# Patient Record
Sex: FEMALE | Race: WHITE | NOT HISPANIC OR LATINO | Employment: UNEMPLOYED | URBAN - METROPOLITAN AREA
[De-identification: names, ages, dates, MRNs, and addresses within clinical notes are randomized per-mention and may not be internally consistent; named-entity substitution may affect disease eponyms.]

---

## 2019-08-01 ENCOUNTER — HOSPITAL ENCOUNTER (EMERGENCY)
Facility: HOSPITAL | Age: 39
Discharge: HOME | End: 2019-08-01
Attending: FAMILY MEDICINE
Payer: COMMERCIAL

## 2019-08-01 VITALS
DIASTOLIC BLOOD PRESSURE: 7 MMHG | HEIGHT: 71 IN | TEMPERATURE: 99 F | HEART RATE: 94 BPM | RESPIRATION RATE: 20 BRPM | WEIGHT: 180 LBS | BODY MASS INDEX: 25.2 KG/M2 | OXYGEN SATURATION: 98 % | SYSTOLIC BLOOD PRESSURE: 124 MMHG
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
## 2019-08-01 DIAGNOSIS — S40.811A ABRASION OF RIGHT UPPER ARM, INITIAL ENCOUNTER: ICD-10-CM
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
## 2019-08-01 DIAGNOSIS — S40.021A TRAUMATIC HEMATOMA OF RIGHT UPPER ARM, INITIAL ENCOUNTER: Primary | ICD-10-CM

## 2019-08-01 DIAGNOSIS — Z34.91 FIRST TRIMESTER PREGNANCY: ICD-10-CM

## 2019-08-01 PROCEDURE — 99282 EMERGENCY DEPT VISIT SF MDM: CPT
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
## 2019-08-01 NOTE — DISCHARGE INSTRUCTIONS
Tylenol as directed for pain. Ice to right arm for 20 minute intervals. You will likely be more sore over next few days. Return to ER for any abdominal pain or vaginal bleeding.
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
## 2019-09-17 ENCOUNTER — TELEPHONE (OUTPATIENT)
Dept: MATERNAL FETAL MEDICINE | Facility: CLINIC | Age: 39
End: 2019-09-17

## 2019-09-17 DIAGNOSIS — O09.522 AMA (ADVANCED MATERNAL AGE) MULTIGRAVIDA 35+, SECOND TRIMESTER: ICD-10-CM

## 2019-09-17 DIAGNOSIS — Z36.89 ENCOUNTER FOR FETAL ANATOMIC SURVEY: ICD-10-CM

## 2019-09-17 DIAGNOSIS — Z36.9 ANTENATAL SCREENING ENCOUNTER: Primary | ICD-10-CM

## 2019-09-17 NOTE — TELEPHONE ENCOUNTER
Left voicemail message for patient to please call [facility] at [phone] to help book her  consult and targeted anatomy ultrasound due to AMA per Dr. YVONNE Roberts referral.

## 2019-09-23 ENCOUNTER — TELEPHONE (OUTPATIENT)
Dept: MATERNAL FETAL MEDICINE | Facility: CLINIC | Age: 39
End: 2019-09-23

## 2019-09-23 NOTE — TELEPHONE ENCOUNTER
Called Evelyn at the office to let her know that I have no good working numbers to contact patient and schedule her.  Evelyn will try to get patient and have her call us.

## 2019-10-04 ENCOUNTER — TELEPHONE (OUTPATIENT)
Dept: MATERNAL FETAL MEDICINE | Facility: CLINIC | Age: 39
End: 2019-10-04

## 2019-10-08 ENCOUNTER — TELEPHONE (OUTPATIENT)
Dept: MATERNAL FETAL MEDICINE | Facility: CLINIC | Age: 39
End: 2019-10-08

## 2019-10-08 NOTE — TELEPHONE ENCOUNTER
Left voicemail for patient on [phone]- calling to schedule ultrasound/consultation ordered by Dr. Roberts. Return phone number given.

## 2019-10-10 ENCOUNTER — TELEPHONE (OUTPATIENT)
Dept: MATERNAL FETAL MEDICINE | Facility: CLINIC | Age: 39
End: 2019-10-10

## 2019-10-10 NOTE — TELEPHONE ENCOUNTER
Patient returning calls to schedule her anatomy US/consult. Scheduled her for next available appt in [place] (11/13/19). Offered patient earlier appt in [place], but patient declines stating she has transportation issues. Verbalizes understanding of appt time, date and location and appt slip mailed.

## 2019-11-12 ENCOUNTER — TELEPHONE (OUTPATIENT)
Dept: MATERNAL FETAL MEDICINE | Facility: CLINIC | Age: 39
End: 2019-11-12

## 2019-11-12 NOTE — TELEPHONE ENCOUNTER
Reminder call made to patient. No answer at this time. LM for patient to arrive a745. Instructed her to call [phone] for any questions.

## 2019-11-13 ENCOUNTER — OFFICE VISIT (OUTPATIENT)
Dept: MATERNAL FETAL MEDICINE | Facility: CLINIC | Age: 39
End: 2019-11-13
Payer: MEDICARE

## 2019-11-13 VITALS
BODY MASS INDEX: 25.76 KG/M2 | HEIGHT: 71 IN | SYSTOLIC BLOOD PRESSURE: 111 MMHG | DIASTOLIC BLOOD PRESSURE: 67 MMHG | WEIGHT: 184 LBS

## 2019-11-13 DIAGNOSIS — O09.522 MULTIGRAVIDA OF ADVANCED MATERNAL AGE IN SECOND TRIMESTER: ICD-10-CM

## 2019-11-13 DIAGNOSIS — O09.522 AMA (ADVANCED MATERNAL AGE) MULTIGRAVIDA 35+, SECOND TRIMESTER: ICD-10-CM

## 2019-11-13 DIAGNOSIS — Z36.89 ENCOUNTER FOR ULTRASOUND TO ASSESS FETAL GROWTH: Primary | ICD-10-CM

## 2019-11-13 DIAGNOSIS — E03.9 HYPOTHYROID IN PREGNANCY, ANTEPARTUM: ICD-10-CM

## 2019-11-13 DIAGNOSIS — O99.280 HYPOTHYROID IN PREGNANCY, ANTEPARTUM: ICD-10-CM

## 2019-11-13 DIAGNOSIS — Z36.89 ENCOUNTER FOR FETAL ANATOMIC SURVEY: ICD-10-CM

## 2019-11-13 DIAGNOSIS — Z36.9 ANTENATAL SCREENING ENCOUNTER: ICD-10-CM

## 2019-11-13 PROCEDURE — 99202 PR OFFICE/OUTPT VISIT, NEW, LEVL II, 15-29 MIN: ICD-10-PCS | Mod: 25,,, | Performed by: OBSTETRICS & GYNECOLOGY

## 2019-11-13 PROCEDURE — 76811 OB US DETAILED SNGL FETUS: CPT | Mod: ,,, | Performed by: OBSTETRICS & GYNECOLOGY

## 2019-11-13 PROCEDURE — 99202 OFFICE O/P NEW SF 15 MIN: CPT | Mod: 25,,, | Performed by: OBSTETRICS & GYNECOLOGY

## 2019-11-13 PROCEDURE — 76811 PR US, OB FETAL EVAL & EXAM, TRANSABDOM,FIRST GESTATION: ICD-10-PCS | Mod: ,,, | Performed by: OBSTETRICS & GYNECOLOGY

## 2019-11-13 RX ORDER — BUPROPION HYDROCHLORIDE 150 MG/1
150 TABLET ORAL DAILY
COMMUNITY
End: 2020-05-15

## 2019-11-13 RX ORDER — LEVOTHYROXINE SODIUM 150 UG/1
TABLET ORAL
Refills: 3 | COMMUNITY
Start: 2019-10-11 | End: 2020-03-25

## 2019-11-13 RX ORDER — ALBUTEROL SULFATE 0.83 MG/ML
2.5 SOLUTION RESPIRATORY (INHALATION) EVERY 6 HOURS PRN
COMMUNITY

## 2019-11-13 NOTE — PROGRESS NOTES
Chief complaint: AMA, Hypothyroid pregnancy    Provider requesting consultation: Dr. Roberts    39 y.o. [id number] 24w5d EGA    PMH:  Past Medical History:   Diagnosis Date    Abnormal Pap smear of cervix     AMA (advanced maternal age) multigravida 35+, second trimester     Asthma     Depression affecting pregnancy in second trimester, antepartum     Hypothyroidism        PObHx:  OB History    Para Term  AB Living   3 2 2     2   SAB TAB Ectopic Multiple Live Births           2      # Outcome Date GA Lbr Darshan/2nd Weight Sex Delivery Anes PTL Lv   3 Current            2 Term 10/20/15 39w0d  4.167 kg (9 lb 3 oz) M Vag-Spont   THERESA      Complications: Hx of preeclampsia, prior pregnancy, currently pregnant, second trimester   1 Term 10/30/06 40w0d  3.26 kg (7 lb 3 oz) M Vag-Spont   THERESA       PSH:  Past Surgical History:   Procedure Laterality Date    TYMPANOSTOMY TUBE PLACEMENT         Family history:family history is not on file.    Social history: reports that she has never smoked. She has never used smokeless tobacco. She reports that she does not drink alcohol or use drugs.    A detailed fetal anatomical ultrasound was completed today.  See details in imaging section of [facility].    Age based risk for Down syndrome at this gestational age is approximately 1 in 79  Aneuploidy screening this pregnancy estimates the risk of Down syndrome to be 1 in 10,000        Today the patient was counseled on the relationship between maternal age and genetic aneuploidy.  The patient was counseled on the risks and benefits of screening tests versus definitive genetic testing (amniocentesis). Patient was counseled about her specific age related risk of Down Syndrome. We discussed the limitations of ultrasound in the definitive diagnosis of Down Syndrome and we discussed amniocentesis as providing definitive diagnosis.  I quoted the patient a 1 in 1000 procedure related risk of fetal loss with genetic amniocentesis.  After today's consultation she  did not want to pursue genetic amniocentesis.    We also discussed non-invasive prenatal diagnosis(NIPD) for trisomy 21, 18 and 13 through free fetal DNA in maternal serum. Non-invasive prenatal diagnosis poses no fetal risks and is done through an in office blood draw. The majority of circulating cell-free fetal DNA in maternal serum comes from placental cells, therefore a small risk, similar to that seen for CVS results, exists for obtaining a result that may represent confined placental mosaicism.  Non-invasive prenatal diagnosis is available from 10 weeks gestation throughout the remainder of the pregnancy. Typically results are available within 8-10 days and are 99% sensitive and specific for trisomy 21 and trisomy 18. Results are 91 % sensitive and >99% specific for trisomy 13. There is no detailed information about the structure of chromosomes 21, 18 or 13, only the relative copy number. Follow-up diagnostic testing through CVS or amniocentesis is recommended for any abnormal result.      -We also reviewed that AMA is associated with an increased risk of adverse pregnancy outcomes such as miscarriage, ectopic pregnancy, diabetes, hypertension, and other adverse outcomes. There is also an increased risk of stillibirth, particularly in women age 40 and above.    -[facility] at [facility] recommends the following for women 35 and older at their J[facility]:   -Detailed fetal anatomic survey at 19 to 20 weeks gestation   -Ultrasound for fetal growth at 32 to 34 weeks gestation   -In women 40 years and older at JAK[facility], recommend weekly MVP and twice weekly NST beginning at 32 weeks gestation.   -In women 40 years and older at JAK[facility], recommend delivery between 39 and 40 weeks gestation.    Hypothyroidism in pregnancy counseling and recommendations:    In patients with underlying hypothyoidism, ACOG currently recommends following patients with TSH levels every four weeks until replacement therapy corrects  the TSH into a normal range.  Because of the unclear association of maternal subclinical hypothyroidism with decreased intelligence testing in their offspring, some experts recommend keeping the TSH level in the lower third of normal.  Once TSH level is in an adequate range, rechecking the TSH level every trimester is indicated.  Reconsult MFM as clinically indicated.    The patient was given an opportunity to ask questions about management and the diease process.  She expressed an understanding of and agreement to the above impression and plan. All questions were answered to her satisfaction.  She was given contact information to the MFM clinic to address further concerns.      The approximate physician face-to-face time was 15 minutes. The majority of the time (>50%) was spent on counseling of the patient or coordination of care.

## 2020-02-23 ENCOUNTER — HOSPITAL ENCOUNTER (INPATIENT)
Facility: HOSPITAL | Age: 40
LOS: 2 days | Discharge: HOME | End: 2020-02-25
Attending: OBSTETRICS & GYNECOLOGY | Admitting: OBSTETRICS & GYNECOLOGY
Payer: MEDICARE

## 2020-02-23 DIAGNOSIS — Z30.2 REQUEST FOR STERILIZATION: Primary | ICD-10-CM

## 2020-02-23 DIAGNOSIS — Z34.90 TERM PREGNANCY: ICD-10-CM

## 2020-02-23 DIAGNOSIS — Z3A.39 39 WEEKS GESTATION OF PREGNANCY: ICD-10-CM

## 2020-02-23 PROBLEM — J45.20 MILD INTERMITTENT ASTHMA WITHOUT COMPLICATION: Status: ACTIVE | Noted: 2020-02-23

## 2020-02-23 LAB
ABO + RH BLD: NORMAL
AMORPH CRY URNS QL MICRO: NORMAL
AMPHET+METHAMPHET UR QL: NEGATIVE
BACTERIA #/AREA URNS HPF: NORMAL /HPF
BARBITURATES UR QL SCN>200 NG/ML: NEGATIVE
BASOPHILS # BLD AUTO: 0.02 K/UL (ref 0–0.2)
BASOPHILS NFR BLD: 0.2 % (ref 0–1.9)
BENZODIAZ UR QL SCN>200 NG/ML: NEGATIVE
BILIRUB UR QL STRIP: NEGATIVE
BLD GP AB SCN CELLS X3 SERPL QL: NORMAL
BZE UR QL SCN: NEGATIVE
CANNABINOIDS UR QL SCN: NEGATIVE
CLARITY UR: CLEAR
COLOR UR: YELLOW
CREAT UR-MCNC: 37.2 MG/DL (ref 15–325)
DIFFERENTIAL METHOD: ABNORMAL
EOSINOPHIL # BLD AUTO: 0 K/UL (ref 0–0.5)
EOSINOPHIL NFR BLD: 0.4 % (ref 0–8)
ERYTHROCYTE [DISTWIDTH] IN BLOOD BY AUTOMATED COUNT: 13.1 % (ref 11.5–14.5)
GLUCOSE UR QL STRIP: NEGATIVE
HCT VFR BLD AUTO: 34 % (ref 37–48.5)
HGB BLD-MCNC: 11.1 G/DL (ref 12–16)
HGB UR QL STRIP: ABNORMAL
IMM GRANULOCYTES # BLD AUTO: 0.03 K/UL (ref 0–0.04)
IMM GRANULOCYTES NFR BLD AUTO: 0.4 % (ref 0–0.5)
KETONES UR QL STRIP: NEGATIVE
LEUKOCYTE ESTERASE UR QL STRIP: ABNORMAL
LYMPHOCYTES # BLD AUTO: 1.9 K/UL (ref 1–4.8)
LYMPHOCYTES NFR BLD: 22.9 % (ref 18–48)
MCH RBC QN AUTO: 28.5 PG (ref 27–31)
MCHC RBC AUTO-ENTMCNC: 32.6 G/DL (ref 32–36)
MCV RBC AUTO: 87 FL (ref 82–98)
MICROSCOPIC COMMENT: NORMAL
MONOCYTES # BLD AUTO: 0.7 K/UL (ref 0.3–1)
MONOCYTES NFR BLD: 8.6 % (ref 4–15)
NEUTROPHILS # BLD AUTO: 5.7 K/UL (ref 1.8–7.7)
NEUTROPHILS NFR BLD: 67.5 % (ref 38–73)
NITRITE UR QL STRIP: NEGATIVE
NRBC BLD-RTO: 0 /100 WBC
OPIATES UR QL SCN: NEGATIVE
PCP UR QL SCN>25 NG/ML: NEGATIVE
PH UR STRIP: 8 [PH] (ref 5–8)
PLATELET # BLD AUTO: 158 K/UL (ref 150–350)
PMV BLD AUTO: 12 FL (ref 9.2–12.9)
PROT UR QL STRIP: NEGATIVE
RBC # BLD AUTO: 3.9 M/UL (ref 4–5.4)
RBC #/AREA URNS HPF: 2 /HPF (ref 0–4)
SP GR UR STRIP: 1.02 (ref 1–1.03)
TOXICOLOGY INFORMATION: NORMAL
URN SPEC COLLECT METH UR: ABNORMAL
UROBILINOGEN UR STRIP-ACNC: NEGATIVE EU/DL
WBC # BLD AUTO: 8.41 K/UL (ref 3.9–12.7)
WBC #/AREA URNS HPF: 2 /HPF (ref 0–5)

## 2020-02-23 PROCEDURE — 80307 DRUG TEST PRSMV CHEM ANLYZR: CPT

## 2020-02-23 PROCEDURE — 25000003 PHARM REV CODE 250: Performed by: OBSTETRICS & GYNECOLOGY

## 2020-02-23 PROCEDURE — 85025 COMPLETE CBC W/AUTO DIFF WBC: CPT

## 2020-02-23 PROCEDURE — 36415 COLL VENOUS BLD VENIPUNCTURE: CPT

## 2020-02-23 PROCEDURE — 63600175 PHARM REV CODE 636 W HCPCS: Performed by: OBSTETRICS & GYNECOLOGY

## 2020-02-23 PROCEDURE — 86850 RBC ANTIBODY SCREEN: CPT

## 2020-02-23 PROCEDURE — 59025 FETAL NON-STRESS TEST: CPT

## 2020-02-23 PROCEDURE — 72200004 HC VAGINAL DELIVERY LEVEL I

## 2020-02-23 PROCEDURE — 87491 CHLMYD TRACH DNA AMP PROBE: CPT

## 2020-02-23 PROCEDURE — 81000 URINALYSIS NONAUTO W/SCOPE: CPT

## 2020-02-23 PROCEDURE — 11000001 HC ACUTE MED/SURG PRIVATE ROOM

## 2020-02-23 PROCEDURE — 63600175 PHARM REV CODE 636 W HCPCS

## 2020-02-23 PROCEDURE — 25000003 PHARM REV CODE 250

## 2020-02-23 PROCEDURE — 72100002 HC LABOR CARE, 1ST 8 HOURS

## 2020-02-23 RX ORDER — MISOPROSTOL 100 UG/1
800 TABLET ORAL
Status: DISCONTINUED | OUTPATIENT
Start: 2020-02-23 | End: 2020-02-25 | Stop reason: HOSPADM

## 2020-02-23 RX ORDER — HYDROCODONE BITARTRATE AND ACETAMINOPHEN 10; 325 MG/1; MG/1
1 TABLET ORAL EVERY 4 HOURS PRN
Status: DISCONTINUED | OUTPATIENT
Start: 2020-02-23 | End: 2020-02-25 | Stop reason: HOSPADM

## 2020-02-23 RX ORDER — SODIUM CHLORIDE, SODIUM LACTATE, POTASSIUM CHLORIDE, CALCIUM CHLORIDE 600; 310; 30; 20 MG/100ML; MG/100ML; MG/100ML; MG/100ML
INJECTION, SOLUTION INTRAVENOUS CONTINUOUS
Status: DISCONTINUED | OUTPATIENT
Start: 2020-02-23 | End: 2020-02-23

## 2020-02-23 RX ORDER — OXYTOCIN/RINGER'S LACTATE 30/500 ML
333 PLASTIC BAG, INJECTION (ML) INTRAVENOUS CONTINUOUS
Status: DISCONTINUED | OUTPATIENT
Start: 2020-02-23 | End: 2020-02-23

## 2020-02-23 RX ORDER — ONDANSETRON 4 MG/1
8 TABLET, ORALLY DISINTEGRATING ORAL EVERY 8 HOURS PRN
Status: DISCONTINUED | OUTPATIENT
Start: 2020-02-23 | End: 2020-02-23

## 2020-02-23 RX ORDER — ONDANSETRON 4 MG/1
8 TABLET, ORALLY DISINTEGRATING ORAL EVERY 8 HOURS PRN
Status: DISCONTINUED | OUTPATIENT
Start: 2020-02-23 | End: 2020-02-25 | Stop reason: HOSPADM

## 2020-02-23 RX ORDER — NAPROXEN 250 MG/1
500 TABLET ORAL EVERY 8 HOURS
Status: DISCONTINUED | OUTPATIENT
Start: 2020-02-24 | End: 2020-02-23

## 2020-02-23 RX ORDER — SODIUM CHLORIDE 9 MG/ML
INJECTION, SOLUTION INTRAVENOUS
Status: DISCONTINUED | OUTPATIENT
Start: 2020-02-23 | End: 2020-02-23

## 2020-02-23 RX ORDER — ACETAMINOPHEN 325 MG/1
650 TABLET ORAL EVERY 6 HOURS PRN
Status: DISCONTINUED | OUTPATIENT
Start: 2020-02-23 | End: 2020-02-25 | Stop reason: HOSPADM

## 2020-02-23 RX ORDER — MISOPROSTOL 100 UG/1
400 TABLET ORAL ONCE
Status: COMPLETED | OUTPATIENT
Start: 2020-02-23 | End: 2020-02-23

## 2020-02-23 RX ORDER — DOCUSATE SODIUM 100 MG/1
200 CAPSULE, LIQUID FILLED ORAL 2 TIMES DAILY PRN
Status: DISCONTINUED | OUTPATIENT
Start: 2020-02-23 | End: 2020-02-25 | Stop reason: HOSPADM

## 2020-02-23 RX ORDER — MISOPROSTOL 100 UG/1
TABLET ORAL
Status: COMPLETED
Start: 2020-02-23 | End: 2020-02-23

## 2020-02-23 RX ORDER — HYDROCODONE BITARTRATE AND ACETAMINOPHEN 7.5; 325 MG/1; MG/1
1 TABLET ORAL EVERY 4 HOURS PRN
Status: DISCONTINUED | OUTPATIENT
Start: 2020-02-23 | End: 2020-02-25 | Stop reason: HOSPADM

## 2020-02-23 RX ORDER — BUTORPHANOL TARTRATE 2 MG/ML
1 INJECTION INTRAMUSCULAR; INTRAVENOUS
Status: DISCONTINUED | OUTPATIENT
Start: 2020-02-23 | End: 2020-02-23

## 2020-02-23 RX ORDER — OXYTOCIN 10 [USP'U]/ML
20 INJECTION, SOLUTION INTRAMUSCULAR; INTRAVENOUS ONCE
Status: COMPLETED | OUTPATIENT
Start: 2020-02-23 | End: 2020-02-23

## 2020-02-23 RX ORDER — OXYTOCIN/RINGER'S LACTATE 30/500 ML
41.65 PLASTIC BAG, INJECTION (ML) INTRAVENOUS CONTINUOUS
Status: DISCONTINUED | OUTPATIENT
Start: 2020-02-23 | End: 2020-02-23

## 2020-02-23 RX ORDER — OXYTOCIN 10 [USP'U]/ML
INJECTION, SOLUTION INTRAMUSCULAR; INTRAVENOUS
Status: COMPLETED
Start: 2020-02-23 | End: 2020-02-23

## 2020-02-23 RX ORDER — NALOXONE HCL 0.4 MG/ML
VIAL (ML) INJECTION
Status: DISCONTINUED
Start: 2020-02-23 | End: 2020-02-23 | Stop reason: WASHOUT

## 2020-02-23 RX ORDER — KETOROLAC TROMETHAMINE 30 MG/ML
30 INJECTION, SOLUTION INTRAMUSCULAR; INTRAVENOUS EVERY 6 HOURS
Status: DISCONTINUED | OUTPATIENT
Start: 2020-02-23 | End: 2020-02-23

## 2020-02-23 RX ORDER — LIDOCAINE HYDROCHLORIDE 10 MG/ML
INJECTION, SOLUTION EPIDURAL; INFILTRATION; INTRACAUDAL; PERINEURAL
Status: DISCONTINUED
Start: 2020-02-23 | End: 2020-02-23 | Stop reason: WASHOUT

## 2020-02-23 RX ORDER — HYDROCORTISONE 25 MG/G
CREAM TOPICAL 3 TIMES DAILY PRN
Status: DISCONTINUED | OUTPATIENT
Start: 2020-02-23 | End: 2020-02-25 | Stop reason: HOSPADM

## 2020-02-23 RX ORDER — BUTORPHANOL TARTRATE 2 MG/ML
2 INJECTION INTRAMUSCULAR; INTRAVENOUS
Status: DISCONTINUED | OUTPATIENT
Start: 2020-02-23 | End: 2020-02-23

## 2020-02-23 RX ORDER — NAPROXEN 250 MG/1
500 TABLET ORAL EVERY 8 HOURS
Status: DISCONTINUED | OUTPATIENT
Start: 2020-02-23 | End: 2020-02-25 | Stop reason: HOSPADM

## 2020-02-23 RX ORDER — SODIUM CHLORIDE 0.9 % (FLUSH) 0.9 %
3 SYRINGE (ML) INJECTION EVERY 8 HOURS
Status: DISCONTINUED | OUTPATIENT
Start: 2020-02-23 | End: 2020-02-23

## 2020-02-23 RX ADMIN — HYDROCORTISONE 2.5%: 25 CREAM TOPICAL at 01:02

## 2020-02-23 RX ADMIN — MISOPROSTOL 400 MCG: 100 TABLET ORAL at 09:02

## 2020-02-23 RX ADMIN — OXYTOCIN 20 UNITS: 10 INJECTION, SOLUTION INTRAMUSCULAR; INTRAVENOUS at 09:02

## 2020-02-23 RX ADMIN — SODIUM CHLORIDE, SODIUM LACTATE, POTASSIUM CHLORIDE, AND CALCIUM CHLORIDE: .6; .31; .03; .02 INJECTION, SOLUTION INTRAVENOUS at 09:02

## 2020-02-23 RX ADMIN — NAPROXEN 500 MG: 250 TABLET ORAL at 09:02

## 2020-02-23 RX ADMIN — BUTORPHANOL TARTRATE: 2 INJECTION, SOLUTION INTRAMUSCULAR; INTRAVENOUS at 09:02

## 2020-02-23 RX ADMIN — SODIUM CHLORIDE, SODIUM LACTATE, POTASSIUM CHLORIDE, AND CALCIUM CHLORIDE 1000 ML: .6; .31; .03; .02 INJECTION, SOLUTION INTRAVENOUS at 08:02

## 2020-02-23 RX ADMIN — DOCUSATE SODIUM 200 MG: 100 CAPSULE, LIQUID FILLED ORAL at 09:02

## 2020-02-23 RX ADMIN — DOCUSATE SODIUM 200 MG: 100 CAPSULE, LIQUID FILLED ORAL at 01:02

## 2020-02-23 RX ADMIN — NAPROXEN 500 MG: 250 TABLET ORAL at 01:02

## 2020-02-23 NOTE — NURSING
OB NURSE TRIAGE NOTE           Chief Complaint:    Chief Complaint   Patient presents with    Laboring       S: 39 y.o.  G_3_ P_2_ with IUP @  __32w2d____ present with c/o Laboring       O:  VS:  Temp:   Vitals:    02/23/20 1100 02/23/20 1120 02/23/20 1133 02/23/20 1148   BP: (!) 105/57 115/70 112/75 (!) 122/57   BP Location:       Patient Position:       Pulse: 87 78 78 65   Resp:       Temp:       TempSrc:       SpO2:       Weight:       Height:         _     FHT'S __150____     CTX'S ___2-3___     SVE:   __5/90/-2____    A: IUP @          DX:      P:  ORDERS:    Orders Placed This Encounter   Procedures    C. trachomatis/N. gonorrhoeae by AMP DNA [facility]; Urine     Standing Status:   Standing     Number of Occurrences:   1     Order Specific Question:   Sources by Resulting Lab:     Answer:   [facility]     Order Specific Question:   Source:     Answer:   Urine     Order Specific Question:   ASAP     Answer:   Yes    Urinalysis, Reflex to Urine Culture Urine, Clean Catch     Standing Status:   Standing     Number of Occurrences:   1     Order Specific Question:   Preferred Collection Type     Answer:   Urine, Clean Catch     Order Specific Question:   Specimen Source     Answer:   Urine    Drug screen panel, emergency     Standing Status:   Standing     Number of Occurrences:   1     Order Specific Question:   Specimen Source     Answer:   Urine    CBC with Auto Differential     Standing Status:   Standing     Number of Occurrences:   1    Urinalysis Microscopic     Standing Status:   Standing     Number of Occurrences:   1    CBC auto differential     Standing Status:   Standing     Number of Occurrences:   1    Diet Adult Regular (IDDSI Level 7)     Standing Status:   Standing     Number of Occurrences:   1    Vital Signs Post Delivery     Every 15 minutes times 1 hour then every 30 minutes until transfer       Standing Status:   Standing     Number of Occurrences:   71383    Assess fundal  height/uterine firmness, lochia, episiotomy     Every 15 minutes for 1 hours then every 30 minutes until transfer     Standing Status:   Standing     Number of Occurrences:   40695    Apply ice to perineum, immediate post-delivery     Standing Status:   Standing     Number of Occurrences:   82430    Notify Pediatrician immediately after delivery     If maternal temperature is greater than 101.0.     Standing Status:   Standing     Number of Occurrences:   1    Vital Signs      Standing Status:   Standing     Number of Occurrences:   1    Intake and output     Standing Status:   Standing     Number of Occurrences:   1    Assess fundal height/uterine firmness, lochia, episiotomy     Every 15 minutes x 1 hour, then every hour x 4 hours, then every shift     Standing Status:   Standing     Number of Occurrences:   02256    Apply Ice to perineum     Standing Status:   Standing     Number of Occurrences:   1    Perform Bladder Scan     If no void times 4 hours post delivery       Standing Status:   Standing     Number of Occurrences:   34189    Perform Intermittent Catheterization     If greater than or equal to 100 mL of urine in bladder       Standing Status:   Standing     Number of Occurrences:   1    Perform Bladder Scan, post intermittent cath     If patient fails second voiding trial after 4 hours of intermittent catheterization       Standing Status:   Standing     Number of Occurrences:   03675    Place indwelling catheter      If greater than or equal to 100 mL of urine in bladder after bladder scan, post intermittent cath         Standing Status:   Standing     Number of Occurrences:   1    Notify Physician     Standing Status:   Standing     Number of Occurrences:   1     Order Specific Question:   Temperature greater than or equal to     Answer:   100.4     Order Specific Question:   Systolic Blood Pressure SBP greater than or equal to     Answer:   160     Order Specific Question:   Systolic Blood  Pressure SBP less than or equal to     Answer:   90     Order Specific Question:   Diastolic Blood Pressure DBP greater than or equal to     Answer:   100     Order Specific Question:   Diastolic Blood Pressure DBP less than or equal to     Answer:   60     Order Specific Question:   Pulse greater than or equal to     Answer:   120     Order Specific Question:   Pulse less than or equal to     Answer:   50     Order Specific Question:   Respirations Rate RR greater than or equal to     Answer:   30     Order Specific Question:   Respirations Rate RR less than or equal to     Answer:   10     Order Specific Question:   Urine output less than     Answer:   35     Comments:   mL in 2 hours    Sitz bath     Prn apin     Standing Status:   Standing     Number of Occurrences:   07518    Full code     Standing Status:   Standing     Number of Occurrences:   1    POCT pH of body fluids (Nitrazine Paper)     Standing Status:   Standing     Number of Occurrences:   1    Type & Screen, Labor & Delivery     Upon admission     Standing Status:   Standing     Number of Occurrences:   1    Insert peripheral IV     Standing Status:   Standing     Number of Occurrences:   1    Admit to Inpatient     I hereby certify that inpatient services were ordered in accordance with Centers for Medicare and Medicaid Services regulations concerning the order and that inpatient services are reasonable and necessary. Services not specified as inpatient only under 42 .22(n) are appropriately provided as inpatient services in accordance with 42 .3(e).     Standing Status:   Standing     Number of Occurrences:   1     Order Specific Question:   Diagnosis     Answer:   Term pregnancy [4079050]     Order Specific Question:   Admitting Provider:     Answer:   EUGENIA VAUGHN [[id number]]     Order Specific Question:   Future Attending Provider     Answer:   EUGENIA VAUGHN [[id number]]     Order Specific Question:   Bed Type Preference      Answer:   Standard [6]     Order Specific Question:   Reason for IP Medical Treatment  (Clinical interventions that can only be accomplished in the IP setting? ) :     Answer:   Labor     Order Specific Question:   Estimated Length of Stay:     Answer:   2 midnights     Order Specific Question:   I certify that Inpatient services for greater than or equal to 2 midnights are medically necessary:     Answer:   Yes     Order Specific Question:   Plans for Post-Acute care--if anticipated (pick the single best option):     Answer:   A. No post acute care anticipated at this time     Order Specific Question:   Special Needs:     Answer:   No Special Needs [1]    Transfer patient     Standing Status:   Standing     Number of Occurrences:   1     Order Specific Question:   If in procedure area, is patient returning to the same unit?     Answer:   No     Order Specific Question:   Diagnosis     Answer:   Term pregnancy [1263411]     Order Specific Question:   Bed Type Preference     Answer:   Standard [6]     Order Specific Question:   Future Inpatient Attending Provider:     Answer:   EUGENIA VAUGHN [[id number]]     Order Specific Question:   Special Needs:     Answer:   No Special Needs [1]    Prepare Rh Immune Globulin     Standing Status:   Standing     Number of Occurrences:   1    Case Request Operating Room: LIGATION, FALLOPIAN TUBE, POSTPARTUM     Standing Status:   Standing     Number of Occurrences:   1     Order Specific Question:   Medical Necessity:     Answer:   Medically Non-Urgent [100]     Order Specific Question:   CPT Code:     Answer:   OR LIGATE FALLOPIAN TUBE,POSTPARTUM [37404]        F/U:        RX:      ETC: ADMIT FOR LABOR      PRECAUTIONS:

## 2020-02-23 NOTE — HPI
40 yo  at 39 wk.  C/o ctx  jprenatal care ama w nl level 2 us at ofh. And t21 wnl  Hypothyroid on synthroid 0.2 micrograms  On asa for prior pih w first pregnancy.  H/o post partum depression  Und fert  Mild asthma on albuterol prn

## 2020-02-23 NOTE — HOSPITAL COURSE
40 yo  at 39 wk.  C/o ctx  jprenatal care ama w nl level 2 us at ofh. And t21 wnl  Hypothyroid on synthroid 0.2 micrograms  On asa for prior pih w first pregnancy.  H/o post partum depression  Und fert  Mild asthma on albuterol prn  hct 34/  arom

## 2020-02-23 NOTE — H&P
[facility] - Labor and Delivery  Obstetrics  History & Physical    Patient Name: Jayde Russ  MRN: [id number]  Admission Date: 2020  Primary Care Provider: Primary Doctor No    Subjective:     Principal Problem:39 weeks gestation of pregnancy    History of Present Illness:  40 yo  at 39 wk.  C/o ctx  jprenatal care ama w nl level 2 us at ofh. And t21 wnl  Hypothyroid on synthroid 0.2 micrograms  On asa for prior pih w first pregnancy.  H/o post partum depression  Und fert  Mild asthma on albuterol prn      Obstetric HPI:  40 yo  at 39 wk.  C/o ctx  jprenatal care ama w nl level 2 us at ofh. And t21 wnl  Hypothyroid on synthroid 0.2 micrograms  On asa for prior pih w first pregnancy.  H/o post partum depression  Und fert  Mild asthma on albuterol prn      Patient reports Date/time of onset: 20 at 0300 contractions, active fetal movement, No vaginal bleeding , No loss of fluid     This pregnancy has been complicated by as above    OB History    Para Term  AB Living   3 2 2 0 0 2   SAB TAB Ectopic Multiple Live Births   0 0 0 0 2      # Outcome Date GA Lbr Darshan/2nd Weight Sex Delivery Anes PTL Lv   3 Current            2 Term 10/20/15 39w0d  4.167 kg (9 lb 3 oz) M Vag-Spont   THERESA      Complications: Hx of preeclampsia, prior pregnancy, currently pregnant, second trimester   1 Term 10/30/06 40w0d  3.26 kg (7 lb 3 oz) M Vag-Spont   THERESA     Past Medical History:   Diagnosis Date    Abnormal Pap smear of cervix     When 18 years old; had to have surgery    AMA (advanced maternal age) multigravida 35+, second trimester     Asthma     Depression affecting pregnancy in second trimester, antepartum     Hypothyroidism      Past Surgical History:   Procedure Laterality Date    TYMPANOSTOMY TUBE PLACEMENT         PTA Medications   Medication Sig    levothyroxine (SYNTHROID) 150 MCG tablet TK 1 T PO QAM    prenatal vit/iron fum/folic ac (PRENATAL 1+1 ORAL) Take by  mouth.    albuterol (PROVENTIL) 2.5 mg /3 mL (0.083 %) nebulizer solution Take 2.5 mg by nebulization every 6 (six) hours as needed for Wheezing. Rescue    buPROPion (WELLBUTRIN XL) 150 MG TB24 tablet Take 150 mg by mouth once daily.       Review of patient's allergies indicates:   Allergen Reactions    Benadryl allergy decongestant     Pcn [penicillins]         Family History     Problem Relation (Age of Onset)    Arthritis Maternal Aunt    Cancer Maternal Grandmother    Hypertension Mother        Tobacco Use    Smoking status: Never Smoker    Smokeless tobacco: Never Used   Substance and Sexual Activity    Alcohol use: Never     Frequency: Never    Drug use: Never    Sexual activity: Yes     Review of Systems   All other systems reviewed and are negative.     Objective:     Vital Signs (Most Recent):  Temp: 97.4 °F (36.3 °C) (20 0745)  Pulse: 104 (20 0800)  Resp: 18 (20 0745)  BP: (!) 140/90 (20 0745)  SpO2: 100 % (20 0800) Vital Signs (24h Range):  Temp:  [97.4 °F (36.3 °C)] 97.4 °F (36.3 °C)  Pulse:  [] 104  Resp:  [18] 18  SpO2:  [98 %-100 %] 100 %  BP: (140)/(90) 140/90     Weight: 85.7 kg (189 lb)  Body mass index is 26.36 kg/m².    FHT: 154Cat 1 (reassuring)  TOCO:  Q 3 minutes    Physical Exam:    HENT:   Head: Normocephalic.    Eyes: EOM are normal.    Neck: Normal range of motion.    Cardiovascular: Normal rate, regular rhythm and normal heart sounds.     Pulmonary/Chest: Breath sounds normal.        Abdominal: Soft.     Genitourinary: Vagina normal.                Skin: Skin is warm.    Psychiatric: She has a normal mood and affect.       Cervix:  Dilation:  9  Effacement:  100%  Station: -1  Presentation: Vertex     Significant Labs:  No results found for: GROUPTRH, HEPBSAG, RUBELLAIGGSC, STREPBCULT, AFP, [id number]    I have personallly reviewed all pertinent lab results from the last 24 hours.    Assessment/Plan:     39 y.o. female  at 39w2d  for:  Anticipate vag del  Post partum btl    No notes have been filed under this hospital service.  Service: Obstetrics and Gynecology      Rangel Rosa MD  Obstetrics  [facility] - Labor and Delivery

## 2020-02-23 NOTE — SUBJECTIVE & OBJECTIVE
Obstetric HPI:  40 yo  at 39 wk.  C/o ctx  jprenatal care ama w nl level 2 us at [facility]. And t21 wnl  Hypothyroid on synthroid 0.2 micrograms  On asa for prior pih w first pregnancy.  H/o post partum depression  Und fert  Mild asthma on albuterol prn      Patient reports Date/time of onset: 20 at 0300 contractions, active fetal movement, No vaginal bleeding , No loss of fluid     This pregnancy has been complicated by as above    OB History    Para Term  AB Living   3 2 2 0 0 2   SAB TAB Ectopic Multiple Live Births   0 0 0 0 2      # Outcome Date GA Lbr Darshan/2nd Weight Sex Delivery Anes PTL Lv   3 Current            2 Term 10/20/15 39w0d  4.167 kg (9 lb 3 oz) M Vag-Spont   THERESA      Complications: Hx of preeclampsia, prior pregnancy, currently pregnant, second trimester   1 Term 10/30/06 40w0d  3.26 kg (7 lb 3 oz) M Vag-Spont   THERESA     Past Medical History:   Diagnosis Date    Abnormal Pap smear of cervix     When 18 years old; had to have surgery    AMA (advanced maternal age) multigravida 35+, second trimester     Asthma     Depression affecting pregnancy in second trimester, antepartum     Hypothyroidism      Past Surgical History:   Procedure Laterality Date    TYMPANOSTOMY TUBE PLACEMENT         PTA Medications   Medication Sig    levothyroxine (SYNTHROID) 150 MCG tablet TK 1 T PO QAM    prenatal vit/iron fum/folic ac (PRENATAL 1+1 ORAL) Take by mouth.    albuterol (PROVENTIL) 2.5 mg /3 mL (0.083 %) nebulizer solution Take 2.5 mg by nebulization every 6 (six) hours as needed for Wheezing. Rescue    buPROPion (WELLBUTRIN XL) 150 MG TB24 tablet Take 150 mg by mouth once daily.       Review of patient's allergies indicates:   Allergen Reactions    Benadryl allergy decongestant     Pcn [penicillins]         Family History     Problem Relation (Age of Onset)    Arthritis Maternal Aunt    Cancer Maternal Grandmother    Hypertension Mother        Tobacco Use    Smoking status: Never  Smoker    Smokeless tobacco: Never Used   Substance and Sexual Activity    Alcohol use: Never     Frequency: Never    Drug use: Never    Sexual activity: Yes     Review of Systems   All other systems reviewed and are negative.     Objective:     Vital Signs (Most Recent):  Temp: 97.4 °F (36.3 °C) (02/23/20 0745)  Pulse: 104 (02/23/20 0800)  Resp: 18 (02/23/20 0745)  BP: (!) 140/90 (02/23/20 0745)  SpO2: 100 % (02/23/20 0800) Vital Signs (24h Range):  Temp:  [97.4 °F (36.3 °C)] 97.4 °F (36.3 °C)  Pulse:  [] 104  Resp:  [18] 18  SpO2:  [98 %-100 %] 100 %  BP: (140)/(90) 140/90     Weight: 85.7 kg (189 lb)  Body mass index is 26.36 kg/m².    FHT: 154Cat 1 (reassuring)  TOCO:  Q 3 minutes    Physical Exam:    HENT:   Head: Normocephalic.    Eyes: EOM are normal.    Neck: Normal range of motion.    Cardiovascular: Normal rate, regular rhythm and normal heart sounds.     Pulmonary/Chest: Breath sounds normal.        Abdominal: Soft.     Genitourinary: Vagina normal.                Skin: Skin is warm.    Psychiatric: She has a normal mood and affect.       Cervix:  Dilation:  9  Effacement:  100%  Station: -1  Presentation: Vertex     Significant Labs:  No results found for: GROUPTRH, HEPBSAG, RUBELLAIGGSC, STREPBCULT, AFP, [id number]    I have personallly reviewed all pertinent lab results from the last 24 hours.

## 2020-02-23 NOTE — NURSING
0740-Patient arrived to LDR1 with c/o regular contractions; states contractions started around 0300 this morning and have gotten stronger and more frequent since then. Denies leakage of fluid but states she has had some white/clear vaginal discharge. Disrobed and gowned. Urine collected and sent to lab. Oriented to room and call-light--LW.    0745-Fetal monitors applied for NST--LW. 0800-Nitrazine negative. SVE 5/90/-2--LW. 0802-Called Dr. Rosa with patient report. Telephone orders received to admit patient for labor and begin bolusing for epidural--LW. 0842-Called Dr. Rosa to advise that patient is now 7.5 cm and feeling intermittent pressure with contractions. Dr. Rosa states he will be here in 15 minutes--LW.    0900-Dr. Rosa at bedside to evaluate patient. SVE 9/100/-1 per Dr. Rosa--LW.

## 2020-02-24 ENCOUNTER — ANESTHESIA (OUTPATIENT)
Dept: SURGERY | Facility: HOSPITAL | Age: 40
End: 2020-02-24
Payer: MEDICARE

## 2020-02-24 ENCOUNTER — ANESTHESIA EVENT (OUTPATIENT)
Dept: SURGERY | Facility: HOSPITAL | Age: 40
End: 2020-02-24
Payer: MEDICARE

## 2020-02-24 LAB
BASOPHILS # BLD AUTO: 0.03 K/UL (ref 0–0.2)
BASOPHILS NFR BLD: 0.3 % (ref 0–1.9)
DIFFERENTIAL METHOD: ABNORMAL
EOSINOPHIL # BLD AUTO: 0 K/UL (ref 0–0.5)
EOSINOPHIL NFR BLD: 0.4 % (ref 0–8)
ERYTHROCYTE [DISTWIDTH] IN BLOOD BY AUTOMATED COUNT: 13.2 % (ref 11.5–14.5)
HCT VFR BLD AUTO: 32.6 % (ref 37–48.5)
HGB BLD-MCNC: 10.8 G/DL (ref 12–16)
IMM GRANULOCYTES # BLD AUTO: 0.03 K/UL (ref 0–0.04)
IMM GRANULOCYTES NFR BLD AUTO: 0.3 % (ref 0–0.5)
LYMPHOCYTES # BLD AUTO: 2.5 K/UL (ref 1–4.8)
LYMPHOCYTES NFR BLD: 24.8 % (ref 18–48)
MCH RBC QN AUTO: 28.7 PG (ref 27–31)
MCHC RBC AUTO-ENTMCNC: 33.1 G/DL (ref 32–36)
MCV RBC AUTO: 87 FL (ref 82–98)
MONOCYTES # BLD AUTO: 0.8 K/UL (ref 0.3–1)
MONOCYTES NFR BLD: 7.7 % (ref 4–15)
NEUTROPHILS # BLD AUTO: 6.6 K/UL (ref 1.8–7.7)
NEUTROPHILS NFR BLD: 66.5 % (ref 38–73)
NRBC BLD-RTO: 0 /100 WBC
PLATELET # BLD AUTO: 149 K/UL (ref 150–350)
PMV BLD AUTO: 11.7 FL (ref 9.2–12.9)
RBC # BLD AUTO: 3.76 M/UL (ref 4–5.4)
WBC # BLD AUTO: 9.96 K/UL (ref 3.9–12.7)

## 2020-02-24 PROCEDURE — 11000001 HC ACUTE MED/SURG PRIVATE ROOM

## 2020-02-24 PROCEDURE — 85025 COMPLETE CBC W/AUTO DIFF WBC: CPT

## 2020-02-24 PROCEDURE — 88302 TISSUE EXAM BY PATHOLOGIST: CPT | Mod: 26,,, | Performed by: PATHOLOGY

## 2020-02-24 PROCEDURE — 63600175 PHARM REV CODE 636 W HCPCS: Performed by: OBSTETRICS & GYNECOLOGY

## 2020-02-24 PROCEDURE — 88302 TISSUE EXAM BY PATHOLOGIST: CPT | Mod: 59 | Performed by: PATHOLOGY

## 2020-02-24 PROCEDURE — 37000009 HC ANESTHESIA EA ADD 15 MINS: Performed by: OBSTETRICS & GYNECOLOGY

## 2020-02-24 PROCEDURE — 25000003 PHARM REV CODE 250: Performed by: OBSTETRICS & GYNECOLOGY

## 2020-02-24 PROCEDURE — 36415 COLL VENOUS BLD VENIPUNCTURE: CPT

## 2020-02-24 PROCEDURE — 37000008 HC ANESTHESIA 1ST 15 MINUTES: Performed by: OBSTETRICS & GYNECOLOGY

## 2020-02-24 PROCEDURE — 88302 PR  SURG PATH,LEVEL II: ICD-10-PCS | Mod: 26,,, | Performed by: PATHOLOGY

## 2020-02-24 PROCEDURE — 71000033 HC RECOVERY, INTIAL HOUR: Performed by: OBSTETRICS & GYNECOLOGY

## 2020-02-24 PROCEDURE — 63600175 PHARM REV CODE 636 W HCPCS: Performed by: NURSE ANESTHETIST, CERTIFIED REGISTERED

## 2020-02-24 PROCEDURE — 25000003 PHARM REV CODE 250: Performed by: NURSE ANESTHETIST, CERTIFIED REGISTERED

## 2020-02-24 PROCEDURE — D9220A PRA ANESTHESIA: ICD-10-PCS | Mod: CRNA,,, | Performed by: NURSE ANESTHETIST, CERTIFIED REGISTERED

## 2020-02-24 PROCEDURE — D9220A PRA ANESTHESIA: Mod: CRNA,,, | Performed by: NURSE ANESTHETIST, CERTIFIED REGISTERED

## 2020-02-24 PROCEDURE — 36000706: Performed by: OBSTETRICS & GYNECOLOGY

## 2020-02-24 PROCEDURE — D9220A PRA ANESTHESIA: ICD-10-PCS | Mod: ANES,,, | Performed by: ANESTHESIOLOGY

## 2020-02-24 PROCEDURE — D9220A PRA ANESTHESIA: Mod: ANES,,, | Performed by: ANESTHESIOLOGY

## 2020-02-24 PROCEDURE — 36000707: Performed by: OBSTETRICS & GYNECOLOGY

## 2020-02-24 RX ORDER — SUCCINYLCHOLINE CHLORIDE 20 MG/ML
INJECTION INTRAMUSCULAR; INTRAVENOUS
Status: DISCONTINUED | OUTPATIENT
Start: 2020-02-24 | End: 2020-02-24

## 2020-02-24 RX ORDER — PROPOFOL 10 MG/ML
VIAL (ML) INTRAVENOUS
Status: DISCONTINUED | OUTPATIENT
Start: 2020-02-24 | End: 2020-02-24

## 2020-02-24 RX ORDER — BUPIVACAINE HYDROCHLORIDE AND EPINEPHRINE 2.5; 5 MG/ML; UG/ML
INJECTION, SOLUTION EPIDURAL; INFILTRATION; INTRACAUDAL; PERINEURAL
Status: DISCONTINUED | OUTPATIENT
Start: 2020-02-24 | End: 2020-02-24 | Stop reason: HOSPADM

## 2020-02-24 RX ORDER — MORPHINE SULFATE 4 MG/ML
3 INJECTION, SOLUTION INTRAMUSCULAR; INTRAVENOUS
Status: CANCELLED | OUTPATIENT
Start: 2020-02-24

## 2020-02-24 RX ORDER — SIMETHICONE 80 MG
80 TABLET,CHEWABLE ORAL EVERY 4 HOURS PRN
Status: CANCELLED | OUTPATIENT
Start: 2020-02-24

## 2020-02-24 RX ORDER — MEPERIDINE HYDROCHLORIDE 50 MG/ML
INJECTION INTRAMUSCULAR; INTRAVENOUS; SUBCUTANEOUS
Status: DISCONTINUED | OUTPATIENT
Start: 2020-02-24 | End: 2020-02-24

## 2020-02-24 RX ORDER — ROCURONIUM BROMIDE 10 MG/ML
INJECTION, SOLUTION INTRAVENOUS
Status: DISCONTINUED | OUTPATIENT
Start: 2020-02-24 | End: 2020-02-24

## 2020-02-24 RX ORDER — ONDANSETRON 4 MG/1
8 TABLET, ORALLY DISINTEGRATING ORAL EVERY 8 HOURS PRN
Status: DISCONTINUED | OUTPATIENT
Start: 2020-02-24 | End: 2020-02-24

## 2020-02-24 RX ORDER — SODIUM CHLORIDE, SODIUM LACTATE, POTASSIUM CHLORIDE, CALCIUM CHLORIDE 600; 310; 30; 20 MG/100ML; MG/100ML; MG/100ML; MG/100ML
INJECTION, SOLUTION INTRAVENOUS CONTINUOUS
Status: DISCONTINUED | OUTPATIENT
Start: 2020-02-24 | End: 2020-02-25 | Stop reason: HOSPADM

## 2020-02-24 RX ORDER — ONDANSETRON 2 MG/ML
INJECTION INTRAMUSCULAR; INTRAVENOUS
Status: DISCONTINUED | OUTPATIENT
Start: 2020-02-24 | End: 2020-02-24

## 2020-02-24 RX ORDER — MIDAZOLAM HYDROCHLORIDE 1 MG/ML
INJECTION, SOLUTION INTRAMUSCULAR; INTRAVENOUS
Status: DISCONTINUED | OUTPATIENT
Start: 2020-02-24 | End: 2020-02-24

## 2020-02-24 RX ADMIN — ROCURONIUM BROMIDE 20 MG: 10 INJECTION, SOLUTION INTRAVENOUS at 09:02

## 2020-02-24 RX ADMIN — NAPROXEN 500 MG: 250 TABLET ORAL at 01:02

## 2020-02-24 RX ADMIN — MIDAZOLAM HYDROCHLORIDE 2 MG: 1 INJECTION, SOLUTION INTRAMUSCULAR; INTRAVENOUS at 09:02

## 2020-02-24 RX ADMIN — SUGAMMADEX 200 MG: 100 INJECTION, SOLUTION INTRAVENOUS at 10:02

## 2020-02-24 RX ADMIN — SODIUM CHLORIDE, POTASSIUM CHLORIDE, SODIUM LACTATE AND CALCIUM CHLORIDE: 600; 310; 30; 20 INJECTION, SOLUTION INTRAVENOUS at 09:02

## 2020-02-24 RX ADMIN — NAPROXEN 500 MG: 250 TABLET ORAL at 06:02

## 2020-02-24 RX ADMIN — ONDANSETRON 8 MG: 4 TABLET, ORALLY DISINTEGRATING ORAL at 11:02

## 2020-02-24 RX ADMIN — DOCUSATE SODIUM 200 MG: 100 CAPSULE, LIQUID FILLED ORAL at 11:02

## 2020-02-24 RX ADMIN — ONDANSETRON 4 MG: 2 INJECTION INTRAMUSCULAR; INTRAVENOUS at 09:02

## 2020-02-24 RX ADMIN — NAPROXEN 500 MG: 250 TABLET ORAL at 10:02

## 2020-02-24 RX ADMIN — PROPOFOL 200 MG: 10 INJECTION, EMULSION INTRAVENOUS at 09:02

## 2020-02-24 RX ADMIN — SUCCINYLCHOLINE CHLORIDE 150 MG: 20 INJECTION, SOLUTION INTRAMUSCULAR; INTRAVENOUS at 09:02

## 2020-02-24 RX ADMIN — HYDROCODONE BITARTRATE AND ACETAMINOPHEN 1 TABLET: 7.5; 325 TABLET ORAL at 11:02

## 2020-02-24 RX ADMIN — MEPERIDINE HYDROCHLORIDE 25 MG: 50 INJECTION INTRAMUSCULAR; INTRAVENOUS; SUBCUTANEOUS at 09:02

## 2020-02-24 RX ADMIN — HYDROCODONE BITARTRATE AND ACETAMINOPHEN 1 TABLET: 7.5; 325 TABLET ORAL at 05:02

## 2020-02-24 NOTE — ANESTHESIA POSTPROCEDURE EVALUATION
Anesthesia Post Evaluation    Patient: Jayde Russ    Procedure(s) Performed: Procedure(s) (LRB):  LIGATION, FALLOPIAN TUBE, POSTPARTUM (Bilateral)    [facility] Anesthesia Post Op Evaluation      Vitals Value Taken Time   /59 2/24/2020  3:47 PM   Temp 36.3 °C (97.4 °F) 2/24/2020 11:33 AM   Pulse 78 2/24/2020  3:47 PM   Resp 16 2/24/2020  3:47 PM   SpO2 98 % 2/24/2020  3:47 PM         Event Time     Out of Recovery 02/24/2020 11:11:00          Pain/Jessie Score: Pain Rating Prior to Med Admin: 5 (2/24/2020  5:08 PM)  Jessie Score: 10 (2/24/2020 10:46 AM)

## 2020-02-24 NOTE — ANESTHESIA POSTPROCEDURE EVALUATION
Anesthesia Post Evaluation    Patient: Jayde Russ    Procedure(s) Performed: Procedure(s) (LRB):  LIGATION, FALLOPIAN TUBE, POSTPARTUM (Bilateral)    Final Anesthesia Type: general    Patient location during evaluation: PACU  Patient participation: Yes- Able to Participate  Level of consciousness: awake and alert  Post-procedure vital signs: reviewed and stable  Pain management: adequate  Airway patency: patent    PONV status at discharge: No PONV  Anesthetic complications: no      Cardiovascular status: blood pressure returned to baseline  Respiratory status: unassisted  Hydration status: euvolemic  Follow-up not needed.          Vitals Value Taken Time   /59 2/24/2020  3:47 PM   Temp 36.3 °C (97.4 °F) 2/24/2020 11:33 AM   Pulse 78 2/24/2020  3:47 PM   Resp 16 2/24/2020  3:47 PM   SpO2 98 % 2/24/2020  3:47 PM         Event Time     Out of Recovery 02/24/2020 11:11:00          Pain/Jessie Score: Pain Rating Prior to Med Admin: 5 (2/24/2020  5:08 PM)  Jessie Score: 10 (2/24/2020 10:46 AM)

## 2020-02-24 NOTE — ANESTHESIA PREPROCEDURE EVALUATION
02/24/2020  Jayde Russ is a 39 y.o., female.    Anesthesia Evaluation    I have reviewed the Patient Summary Reports.    I have reviewed the Nursing Notes.   I have reviewed the Medications.     Review of Systems  Social:  Non-Smoker    Hematology/Oncology:  Hematology Normal   Oncology Normal     EENT/Dental:EENT/Dental Normal   Cardiovascular:  Cardiovascular Normal     Pulmonary:  Pulmonary Normal    Renal/:  Renal/ Normal     Hepatic/GI:  Hepatic/GI Normal    Musculoskeletal:  Musculoskeletal Normal    Neurological:  Neurology Normal    Endocrine:   Hypothyroidism        Physical Exam  General:  Well nourished    Airway/Jaw/Neck:  Airway Findings: Mouth Opening: Normal Tongue: Normal  General Airway Assessment: Adult  Mallampati: II  TM Distance: Normal, at least 6 cm       Chest/Lungs:  Chest/Lungs Findings: Clear to auscultation     Heart/Vascular:  Heart Findings: Rate: Normal  Rhythm: Regular Rhythm        Mental Status:  Mental Status Findings:  Cooperative, Alert and Oriented         Anesthesia Plan  Type of Anesthesia, risks & benefits discussed:  Anesthesia Type:  general  Patient's Preference:   Intra-op Monitoring Plan: standard ASA monitors  Intra-op Monitoring Plan Comments:   Post Op Pain Control Plan: IV/PO Opioids PRN  Post Op Pain Control Plan Comments:   Induction:   IV  Beta Blocker:  Patient is not currently on a Beta-Blocker (No further documentation required).       Informed Consent: Patient understands risks and agrees with Anesthesia plan.  Questions answered. Anesthesia consent signed with patient.  ASA Score: 2     Day of Surgery Review of History & Physical: I have interviewed and examined the patient. I have reviewed the patient's H&P dated:            Ready For Surgery From Anesthesia Perspective.

## 2020-02-24 NOTE — BRIEF OP NOTE
[facility]  Brief Operative Note    SUMMARY     Surgery Date: 2/24/2020     Surgeon(s) and Role:     * Rangel Rosa MD - Primary    Assisting Surgeon: None    Pre-op Diagnosis:  Term pregnancy [Z34.90]  Request for sterilization [Z30.2]    Post-op Diagnosis:  Post-Op Diagnosis Codes:     * Term pregnancy [Z34.90]     * Request for sterilization [Z30.2]    Procedure(s) (LRB):  LIGATION, FALLOPIAN TUBE, POSTPARTUM (Bilateral)    Anesthesia: Choice    Description of Procedure: btl    Description of the findings of the procedure: incision supra umbilical    Estimated Blood Loss: * No values recorded between 2/24/2020  9:33 AM and 2/24/2020 10:11 AM *         Specimens:   Specimen (12h ago, onward)    None

## 2020-02-24 NOTE — PLAN OF CARE
02/24/20 1230   Medicare Message   Important Message from Medicare regarding Discharge Appeal Rights Given to patient/caregiver;Explained to patient/caregiver;Signed/date by patient/caregiver   Date IMM was signed 02/24/20   Time IMM was signed 1230

## 2020-02-24 NOTE — NURSING
Received care of patient back from recovery.  Pt assisted back to bed.  Assessment done.  Dressing dry clean and intact.  Iv infusing into L hand, and dressing is dry clean and intact.  Pt medicated with Norco 7.5 mg for pain, and zofran 8mg for nausea.  Pt voices no other complaints and shows no signs of distress.  Will Allow pt to rest.

## 2020-02-24 NOTE — NURSING
Assessment done on Pt.  Vital signs stable.  Lungs clear to auscultation, Apical pulse rate normal.  Fundus firm and 2 cm below umbilicus.  Pt has been NPO since 8pm last night and is scheduled for BTL today. No s/s of any distress and no questions or concerns voiced at this time.

## 2020-02-24 NOTE — NURSING
Pt's eyes are very swollen and were not like this earlier.  Pt states she does have some seasonal allergies, but I feel like this needs further assessment.  Will continue to monitor pt's eyes.  Cold compresses placed on eyes.

## 2020-02-24 NOTE — PLAN OF CARE
02/23/20 1650   Medicare Message   Important Message from Medicare regarding Discharge Appeal Rights Given to patient/caregiver;Explained to patient/caregiver;Signed/date by patient/caregiver   Date IMM was signed 02/23/20   Time IMM was signed 1650

## 2020-02-25 VITALS
HEIGHT: 71 IN | WEIGHT: 189 LBS | TEMPERATURE: 97 F | BODY MASS INDEX: 26.46 KG/M2 | DIASTOLIC BLOOD PRESSURE: 69 MMHG | SYSTOLIC BLOOD PRESSURE: 118 MMHG | HEART RATE: 88 BPM | RESPIRATION RATE: 16 BRPM | OXYGEN SATURATION: 98 %

## 2020-02-25 PROCEDURE — 25000003 PHARM REV CODE 250: Performed by: OBSTETRICS & GYNECOLOGY

## 2020-02-25 RX ORDER — HYDROCODONE BITARTRATE AND ACETAMINOPHEN 10; 325 MG/1; MG/1
1 TABLET ORAL EVERY 4 HOURS PRN
Qty: 20 TABLET | Refills: 0 | Status: SHIPPED | OUTPATIENT
Start: 2020-02-25 | End: 2020-03-16

## 2020-02-25 RX ORDER — DOCUSATE SODIUM 100 MG/1
200 CAPSULE, LIQUID FILLED ORAL 2 TIMES DAILY PRN
Qty: 20 CAPSULE | Refills: 0 | Status: SHIPPED | OUTPATIENT
Start: 2020-02-25

## 2020-02-25 RX ORDER — NAPROXEN 500 MG/1
500 TABLET ORAL EVERY 8 HOURS
Qty: 30 TABLET | Refills: 1 | Status: SHIPPED | OUTPATIENT
Start: 2020-02-25

## 2020-02-25 RX ADMIN — HYDROCODONE BITARTRATE AND ACETAMINOPHEN 1 TABLET: 7.5; 325 TABLET ORAL at 05:02

## 2020-02-25 RX ADMIN — HYDROCODONE BITARTRATE AND ACETAMINOPHEN 1 TABLET: 10; 325 TABLET ORAL at 09:02

## 2020-02-25 RX ADMIN — NAPROXEN 500 MG: 250 TABLET ORAL at 05:02

## 2020-02-25 NOTE — NURSING
D/c instructions reviewed with patient at this time.  She v/u of all instructions.  Prescriptions given to patient for naproxen, norco, and colace.  Respirations are even and unlabored.  No distress noted.  No complaints at this time.  Patient will ambulate from unit per her request when she is ready to leave.  Will continue to monitor.

## 2020-02-25 NOTE — DISCHARGE INSTRUCTIONS
No sex no lifting greater than 20 lbs for 20 lbs.  notify us of temp greater than 100.4, heavy bleeding, severe pain

## 2020-02-25 NOTE — NURSING
Patient ambulated to private vehicle with , , and all personal belongings. Patient in stable condition at the time of discharge. Patient denies any further questions or concerns at this time.

## 2020-02-25 NOTE — DISCHARGE SUMMARY
[facility]  Obstetrics  Discharge Summary      Patient Name: Jayde Russ  MRN: [id number]  Admission Date: 2020  Hospital Length of Stay: 2 days  Discharge Date and Time:  2020 6:53 AM  Attending Physician: Rangel Rosa MD   Discharging Provider: Rangel Rosa MD   Primary Care Provider: Primary Doctor No    HPI: 40 yo  at 39 wk.  C/o ctx  jprenatal care ama w nl level 2 us at [facility]. And t21 wnl  Hypothyroid on synthroid 0.2 micrograms  On asa for prior pih w first pregnancy.  H/o post partum depression  Und fert  Mild asthma on albuterol prn          Procedure(s) (LRB):  LIGATION, FALLOPIAN TUBE, POSTPARTUM (Bilateral)     Hospital Course:   40 yo  at 39 wk.  C/o ctx  jprenatal care ama w nl level 2 us at [facility]. And t21 wnl  Hypothyroid on synthroid 0.2 micrograms  On asa for prior pih w first pregnancy.  H/o post partum depression  Und fert  Mild asthma on albuterol prn  hct 34/  arom          Final Active Diagnoses:    Diagnosis Date Noted POA    PRINCIPAL PROBLEM:  39 weeks gestation of pregnancy [Z3A.39] 2020 Not Applicable    Mild intermittent asthma without complication [J45.20] 2020 Unknown    Request for sterilization [Z30.2] 2020 Not Applicable    Term pregnancy [Z34.90] 2020 Not Applicable      Problems Resolved During this Admission:        Labs: All labs within the past 24 hours have been reviewed    Feeding Method: bottle    Immunizations     Date Immunization Status Dose Route/Site Given by    20 1104 MMR Deleted 0.5 mL Subcutaneous/Left deltoid     20 1104 Tdap Deleted 0.5 mL Intramuscular/Left deltoid     20 1104 Rho (D) Immune Globulin - IM Deleted 300 mcg Intramuscular/           Delivery:    Episiotomy: None   Lacerations: None   Repair suture:     Repair # of packets:     Blood loss (ml): 200     Birth information:  YOB: 2020   Time of birth: 9:40 AM   Sex: male   Delivery type:  Vaginal, Spontaneous   Gestational Age: 39w2d    Delivery Clinician:      Other providers:       Additional  information:  Forceps:    Vacuum:    Breech:    Observed anomalies      Living?:           APGARS  One minute Five minutes Ten minutes   Skin color:         Heart rate:         Grimace:         Muscle tone:         Breathing:         Totals: 8  9        Placenta: Delivered:       appearance    Pending Diagnostic Studies:     Procedure Component Value Units Date/Time    Specimen to Pathology, Surgery Gynecology and Obstetrics [[id number]] Collected:  02/24/20 0953    Order Status:  Sent Lab Status:  In process Updated:  02/24/20 1058          Discharged Condition: good    Disposition: Home or Self Care    Follow Up:  Follow-up Information     Rangel Rosa MD In 1 week.    Specialty:  Obstetrics and Gynecology  Contact information:  [address] [place] [address] [place] [address] [phone]                 Patient Instructions:   No discharge procedures on file.  Medications:  Current Discharge Medication List      START taking these medications    Details   docusate sodium (COLACE) 100 MG capsule Take 2 capsules (200 mg total) by mouth 2 (two) times daily as needed for Constipation.  Qty: 20 capsule, Refills: 0      HYDROcodone-acetaminophen (NORCO)  mg per tablet Take 1 tablet by mouth every 4 (four) hours as needed.  Qty: 20 tablet, Refills: 0    Comments: n/a       naproxen (NAPROSYN) 500 MG tablet Take 1 tablet (500 mg total) by mouth every 8 (eight) hours.  Qty: 30 tablet, Refills: 1         CONTINUE these medications which have NOT CHANGED    Details   levothyroxine (SYNTHROID) 150 MCG tablet TK 1 T PO QAM  Refills: 3      prenatal vit/iron fum/folic ac (PRENATAL 1+1 ORAL) Take by mouth.      albuterol (PROVENTIL) 2.5 mg /3 mL (0.083 %) nebulizer solution Take 2.5 mg by nebulization every 6 (six) hours as needed for Wheezing. Rescue      buPROPion (WELLBUTRIN XL) 150 MG TB24  tablet Take 150 mg by mouth once daily.             Rangel Rosa MD  Obstetrics  [facility]

## 2020-02-26 LAB
C TRACH DNA SPEC QL NAA+PROBE: NOT DETECTED
N GONORRHOEA DNA SPEC QL NAA+PROBE: NOT DETECTED

## 2020-02-27 LAB
FINAL PATHOLOGIC DIAGNOSIS: NORMAL
GROSS: NORMAL

## 2020-02-27 NOTE — OP NOTE
[facility]  Operative Note     SUMMARY     Surgery Date: 2/24/2020       Pre-op Diagnosis:  Term pregnancy [Z34.90]  Request for sterilization [Z30.2]    Post-op Diagnosis:  Post-Op Diagnosis Codes:     * Term pregnancy [Z34.90]     * Request for sterilization [Z30.2]    Procedure(s) (LRB):  LIGATION, FALLOPIAN TUBE, POSTPARTUM (Bilateral)    Surgeon(s) and Role:     * Rangel Rosa MD - Primary      Estimated Blood Loss: * No values recorded between 2/24/2020  9:33 AM and 2/24/2020 10:15 AM *    Anesthesia:  Choice    Description of the findings of the procedure:  Term pregnancy [Z34.90]  Request for sterilization [Z30.2]           Procedure:  Patient was taken to the operating  Given general as anesthesia   Prepped and draped in usual fashion  Incision was made supra umbilical  Abdomen was entered in layers without difficulty  Each tube was identified by tracing it toward lateral fimbriated end  Doubly ligated with 2 0 plain  Knuckle was excised  Hemostatic tubal lumens were noted  Fascia closed was with 0 Vicryl running fashion  4-0 Vicryl closed the skin subcuticularly  Lab needle sponge instrument count correct x2  Patient extubated and taken to the recovery room in good condition

## 2020-02-27 NOTE — L&D DELIVERY NOTE
Pre-Operative Diagnosis:   Term pregnancy       Post-Operative Diagnosis:   Same   Delivery a viable 3.3 kg infant Apgars 8 /9    Surgery:     Surgeon: Rangel Rosa MD    Anesthesia: Epidural    EBL:  200 cc    Findings:   Viable 3.3 kg infant with 8/9 Apgars  Placenta delivered spontaneous intact  Position: Vertex, OA      Episiotomy:   None    Lacerations:   none    Specimens: None    Complications: None       Delivery Information for Damion Russ    Birth information:  YOB: 2020   Time of birth: 9:40 AM   Sex: male   Head Delivery Date/Time: 2020  9:39 AM   Delivery type: Vaginal, Spontaneous   Gestational Age: 39w2d    Delivery Providers    Delivering clinician:  Rangel Rosa MD   Provider Role    Yoidt Sabillon RN Registered Nurse    Anna Moser RN Registered Nurse    Aminata Rodas RN Registered Nurse            Measurements    Weight:  3331 g  Length:  48.3 cm  Head circumference:  34.3 cm  Chest circumference:  34.3 cm         Apgars    Living status:  Living  Apgars:   1 min.:   5 min.:   10 min.:   15 min.:   20 min.:     Skin color:   0  1       Heart rate:   2  2       Reflex irritability:   2  2       Muscle tone:   2  2       Respiratory effort:   2  2       Total:   8  9       Apgars assigned by:  DESTINEY MONGE         Operative Delivery    Forceps attempted?:  No  Vacuum extractor attempted?:  No         Shoulder Dystocia    Shoulder dystocia present?:  No           Presentation    Presentation:  Vertex  Position:  Middle Occiput Anterior           Interventions/Resuscitation    Method:  Bulb Suctioning       Cord    Vessels:  3 vessels  Complications:  Nuchal  Nuchal Intervention:  reduced  Nuchal Cord Description:  tight nuchal cord  Number of Loops:  1  Delayed Cord Clamping?:  No  Cord Clamped Date/Time:  2020  9:41 AM  Cord Blood Disposition:  Lab  Gases Sent?:  No  Stem Cell Collection (by MD):  No       Placenta    Placenta delivery  date/time:  2020 0947  Placenta removal:  Spontaneous  Placenta appearance:  Intact  Placenta disposition:  discarded           Labor Events:       labor: No     Labor Onset Date/Time: 2020 03:00     Dilation Complete Date/Time: 2020 09:19     Start Pushing Date/Time: 2020 09:31     Rupture Date/Time:              Rupture type:           Fluid Amount:        Fluid Color:        Fluid Odor:        Membrane Status (PeriCalm):        Rupture Date/Time (PeriCalm):        Fluid Amount (PeriCalm):        Fluid Color (PeriCalm):         steroids: None     Antibiotics given for GBS: No     Induction:       Indications for induction:        Augmentation:       Indications for augmentation:       Labor complications: None     Additional complications:          Cervical ripening:                     Delivery:      Episiotomy: None     Indication for Episiotomy:       Perineal Lacerations: None Repaired:      Periurethral Laceration:   Repaired:     Labial Laceration:   Repaired:     Sulcus Laceration:   Repaired:     Vaginal Laceration:   Repaired:     Cervical Laceration:   Repaired:     Repair suture:       Repair # of packets:       Last Value - EBL - Nursing (mL): 200     Sum - EBL - Nursing (mL): 200     Last Value - EBL - Anesthesia (mL):      Calculated QBL (mL): 267      Vaginal Sweep Performed: Yes     Surgicount Correct: Yes       Other providers:       Anesthesia    Method:  None          Details (if applicable):  Trial of Labor      Categorization:      Priority:     Indications for :     Incision Type:       Additional  information:  Forceps:    Vacuum:    Breech:    Observed anomalies    Other (Comments):

## 2020-02-27 NOTE — PLAN OF CARE
02/27/20 1411   Final Note   Assessment Type Final Discharge Note   Anticipated Discharge Disposition Home   What phone number can be called within the next 1-3 days to see how you are doing after discharge? [phone]   Hospital Follow Up  Appt(s) scheduled? Yes   Discharge plans and expectations educations in teach back method with documentation complete? No   Left message for patient with follow up appointment for Dr Rosa & Carlos Bush NP.

## 2020-03-23 PROBLEM — Z3A.39 39 WEEKS GESTATION OF PREGNANCY: Status: RESOLVED | Noted: 2020-02-23 | Resolved: 2020-03-23

## 2020-03-23 PROBLEM — Z34.90 TERM PREGNANCY: Status: RESOLVED | Noted: 2020-02-23 | Resolved: 2020-03-23

## 2020-03-23 PROBLEM — O09.522 MULTIGRAVIDA OF ADVANCED MATERNAL AGE IN SECOND TRIMESTER: Status: RESOLVED | Noted: 2019-11-13 | Resolved: 2020-03-23

## 2021-05-28 ENCOUNTER — HOSPITAL ENCOUNTER (OUTPATIENT)
Dept: RADIOLOGY | Facility: HOSPITAL | Age: 41
Discharge: HOME | End: 2021-05-28
Attending: NURSE PRACTITIONER
Payer: MEDICARE

## 2021-05-28 DIAGNOSIS — E03.9 HYPOTHYROIDISM, UNSPECIFIED TYPE: ICD-10-CM

## 2021-05-28 PROCEDURE — 76536 US EXAM OF HEAD AND NECK: CPT | Mod: TC

## 2021-05-28 PROCEDURE — 76536 US THYROID: ICD-10-PCS | Mod: 26,,, | Performed by: RADIOLOGY

## 2021-05-28 PROCEDURE — 76536 US EXAM OF HEAD AND NECK: CPT | Mod: 26,,, | Performed by: RADIOLOGY

## 2021-07-01 ENCOUNTER — PATIENT MESSAGE (OUTPATIENT)
Dept: ADMINISTRATIVE | Facility: OTHER | Age: 41
End: 2021-07-01

## 2021-08-02 ENCOUNTER — PATIENT MESSAGE (OUTPATIENT)
Dept: FAMILY MEDICINE | Facility: CLINIC | Age: 41
End: 2021-08-02

## 2021-08-02 ENCOUNTER — TELEPHONE (OUTPATIENT)
Dept: FAMILY MEDICINE | Facility: CLINIC | Age: 41
End: 2021-08-02

## 2021-08-03 ENCOUNTER — TELEPHONE (OUTPATIENT)
Dept: FAMILY MEDICINE | Facility: CLINIC | Age: 41
End: 2021-08-03

## 2021-08-03 ENCOUNTER — PATIENT MESSAGE (OUTPATIENT)
Dept: FAMILY MEDICINE | Facility: CLINIC | Age: 41
End: 2021-08-03

## 2021-08-04 ENCOUNTER — PATIENT MESSAGE (OUTPATIENT)
Dept: FAMILY MEDICINE | Facility: CLINIC | Age: 41
End: 2021-08-04

## 2021-08-13 ENCOUNTER — HOSPITAL ENCOUNTER (OUTPATIENT)
Dept: RADIOLOGY | Facility: HOSPITAL | Age: 41
Discharge: HOME | End: 2021-08-13
Attending: NURSE PRACTITIONER
Payer: MEDICARE

## 2021-08-13 VITALS — WEIGHT: 180.31 LBS | HEIGHT: 71 IN | BODY MASS INDEX: 25.24 KG/M2

## 2021-08-13 DIAGNOSIS — Z12.31 ENCOUNTER FOR SCREENING MAMMOGRAM FOR BREAST CANCER: ICD-10-CM

## 2021-08-13 PROCEDURE — 77063 BREAST TOMOSYNTHESIS BI: CPT | Mod: 26,,, | Performed by: RADIOLOGY

## 2021-08-13 PROCEDURE — 77067 SCR MAMMO BI INCL CAD: CPT | Mod: 26,,, | Performed by: RADIOLOGY

## 2021-08-13 PROCEDURE — 77063 MAMMO DIGITAL SCREENING BILAT WITH TOMO: ICD-10-PCS | Mod: 26,,, | Performed by: RADIOLOGY

## 2021-08-13 PROCEDURE — 77067 SCR MAMMO BI INCL CAD: CPT | Mod: TC

## 2021-08-13 PROCEDURE — 77067 MAMMO DIGITAL SCREENING BILAT WITH TOMO: ICD-10-PCS | Mod: 26,,, | Performed by: RADIOLOGY

## 2021-09-28 ENCOUNTER — OFFICE VISIT (OUTPATIENT)
Dept: ENDOCRINOLOGY | Facility: CLINIC | Age: 41
End: 2021-09-28
Payer: MEDICARE

## 2021-09-28 VITALS
HEART RATE: 76 BPM | WEIGHT: 177.81 LBS | DIASTOLIC BLOOD PRESSURE: 80 MMHG | BODY MASS INDEX: 24.89 KG/M2 | SYSTOLIC BLOOD PRESSURE: 132 MMHG | TEMPERATURE: 98 F | HEIGHT: 71 IN | OXYGEN SATURATION: 98 %

## 2021-09-28 DIAGNOSIS — E03.1 CONGENITAL HYPOTHYROIDISM: Primary | ICD-10-CM

## 2021-09-28 PROCEDURE — 99203 OFFICE O/P NEW LOW 30 MIN: CPT | Mod: S$PBB,,, | Performed by: PHYSICIAN ASSISTANT

## 2021-09-28 PROCEDURE — 99999 PR PBB SHADOW E&M-EST. PATIENT-LVL IV: CPT | Mod: PBBFAC,,, | Performed by: PHYSICIAN ASSISTANT

## 2021-09-28 PROCEDURE — 99203 PR OFFICE/OUTPT VISIT, NEW, LEVL III, 30-44 MIN: ICD-10-PCS | Mod: S$PBB,,, | Performed by: PHYSICIAN ASSISTANT

## 2021-09-28 PROCEDURE — 99999 PR PBB SHADOW E&M-EST. PATIENT-LVL IV: ICD-10-PCS | Mod: PBBFAC,,, | Performed by: PHYSICIAN ASSISTANT

## 2021-09-28 PROCEDURE — 99214 OFFICE O/P EST MOD 30 MIN: CPT | Mod: PBBFAC,PO | Performed by: PHYSICIAN ASSISTANT

## 2021-10-05 ENCOUNTER — LAB VISIT (OUTPATIENT)
Dept: LAB | Facility: HOSPITAL | Age: 41
End: 2021-10-05
Attending: PHYSICIAN ASSISTANT
Payer: MEDICARE

## 2021-10-05 DIAGNOSIS — E03.1 CONGENITAL HYPOTHYROIDISM: ICD-10-CM

## 2021-10-05 LAB
T4 FREE SERPL-MCNC: 1.15 NG/DL (ref 0.71–1.51)
TSH SERPL DL<=0.005 MIU/L-ACNC: 0.25 UIU/ML (ref 0.4–4)

## 2021-10-05 PROCEDURE — 36415 COLL VENOUS BLD VENIPUNCTURE: CPT | Performed by: PHYSICIAN ASSISTANT

## 2021-10-05 PROCEDURE — 84443 ASSAY THYROID STIM HORMONE: CPT | Performed by: PHYSICIAN ASSISTANT

## 2021-10-05 PROCEDURE — 84439 ASSAY OF FREE THYROXINE: CPT | Performed by: PHYSICIAN ASSISTANT

## 2021-10-28 ENCOUNTER — HOSPITAL ENCOUNTER (EMERGENCY)
Facility: HOSPITAL | Age: 41
Discharge: HOME | End: 2021-10-28
Attending: EMERGENCY MEDICINE
Payer: MEDICARE

## 2021-10-28 VITALS
SYSTOLIC BLOOD PRESSURE: 121 MMHG | HEART RATE: 86 BPM | WEIGHT: 180 LBS | RESPIRATION RATE: 19 BRPM | BODY MASS INDEX: 25.2 KG/M2 | HEIGHT: 71 IN | DIASTOLIC BLOOD PRESSURE: 87 MMHG | OXYGEN SATURATION: 97 % | TEMPERATURE: 98 F
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
## 2021-10-28 DIAGNOSIS — S92.151A CLOSED DISPLACED AVULSION FRACTURE OF RIGHT TALUS, INITIAL ENCOUNTER: Primary | ICD-10-CM

## 2021-10-28 PROCEDURE — 99283 EMERGENCY DEPT VISIT LOW MDM: CPT | Mod: 25

## 2021-10-28 PROCEDURE — 73610 X-RAY EXAM OF ANKLE: CPT | Mod: 26,RT,, | Performed by: RADIOLOGY

## 2021-10-28 PROCEDURE — 73630 X-RAY EXAM OF FOOT: CPT | Mod: TC,FY,RT

## 2021-10-28 PROCEDURE — 73610 XR ANKLE COMPLETE 3 VIEW RIGHT: ICD-10-PCS | Mod: 26,RT,, | Performed by: RADIOLOGY

## 2021-10-28 PROCEDURE — 73610 X-RAY EXAM OF ANKLE: CPT | Mod: TC,FY,RT

## 2021-10-28 PROCEDURE — 73630 X-RAY EXAM OF FOOT: CPT | Mod: 26,RT,, | Performed by: RADIOLOGY

## 2021-10-28 PROCEDURE — 73630 XR FOOT COMPLETE 3 VIEW RIGHT: ICD-10-PCS | Mod: 26,RT,, | Performed by: RADIOLOGY

## 2021-10-28 RX ORDER — IBUPROFEN 800 MG/1
800 TABLET ORAL EVERY 6 HOURS PRN
Qty: 20 TABLET | Refills: 0 | Status: SHIPPED | OUTPATIENT
Start: 2021-10-28

## 2021-10-29 ENCOUNTER — TELEPHONE (OUTPATIENT)
Dept: ORTHOPEDICS | Facility: CLINIC | Age: 41
End: 2021-10-29
Payer: MEDICARE

## 2021-11-01 DIAGNOSIS — M79.671 RIGHT FOOT PAIN: Primary | ICD-10-CM

## 2021-11-01 DIAGNOSIS — M25.571 ACUTE RIGHT ANKLE PAIN: ICD-10-CM

## 2021-11-03 ENCOUNTER — OFFICE VISIT (OUTPATIENT)
Dept: ORTHOPEDICS | Facility: CLINIC | Age: 41
End: 2021-11-03
Payer: MEDICARE

## 2021-11-03 ENCOUNTER — TELEPHONE (OUTPATIENT)
Dept: ORTHOPEDICS | Facility: CLINIC | Age: 41
End: 2021-11-03

## 2021-11-03 ENCOUNTER — HOSPITAL ENCOUNTER (OUTPATIENT)
Dept: RADIOLOGY | Facility: HOSPITAL | Age: 41
Discharge: HOME | End: 2021-11-03
Attending: ORTHOPAEDIC SURGERY
Payer: MEDICARE

## 2021-11-03 VITALS — HEART RATE: 92 BPM | OXYGEN SATURATION: 98 % | WEIGHT: 180 LBS | HEIGHT: 71 IN | BODY MASS INDEX: 25.2 KG/M2

## 2021-11-03 DIAGNOSIS — M79.671 RIGHT FOOT PAIN: ICD-10-CM

## 2021-11-03 DIAGNOSIS — M25.571 ACUTE RIGHT ANKLE PAIN: ICD-10-CM

## 2021-11-03 DIAGNOSIS — S92.151A CLOSED DISPLACED AVULSION FRACTURE OF RIGHT TALUS, INITIAL ENCOUNTER: Primary | ICD-10-CM

## 2021-11-03 DIAGNOSIS — S93.491A SPRAIN OF ANTERIOR TALOFIBULAR LIGAMENT OF RIGHT ANKLE, INITIAL ENCOUNTER: ICD-10-CM

## 2021-11-03 PROCEDURE — 73610 X-RAY EXAM OF ANKLE: CPT | Mod: TC,PN,RT

## 2021-11-03 PROCEDURE — 73610 XR ANKLE COMPLETE 3 VIEW RIGHT: ICD-10-PCS | Mod: 26,RT,, | Performed by: RADIOLOGY

## 2021-11-03 PROCEDURE — 28430 CLTX TALUS FRACTURE W/O MNPJ: CPT | Mod: S$PBB,RT,, | Performed by: ORTHOPAEDIC SURGERY

## 2021-11-03 PROCEDURE — 73630 X-RAY EXAM OF FOOT: CPT | Mod: 26,RT,, | Performed by: RADIOLOGY

## 2021-11-03 PROCEDURE — 99203 PR OFFICE/OUTPT VISIT, NEW, LEVL III, 30-44 MIN: ICD-10-PCS | Mod: S$PBB,57,, | Performed by: ORTHOPAEDIC SURGERY

## 2021-11-03 PROCEDURE — 28430 PR CLOSED RX TALUS FX: ICD-10-PCS | Mod: S$PBB,RT,, | Performed by: ORTHOPAEDIC SURGERY

## 2021-11-03 PROCEDURE — 28430 CLTX TALUS FRACTURE W/O MNPJ: CPT | Mod: PBBFAC,PN,RT | Performed by: ORTHOPAEDIC SURGERY

## 2021-11-03 PROCEDURE — 99213 OFFICE O/P EST LOW 20 MIN: CPT | Mod: PBBFAC,PN,25 | Performed by: ORTHOPAEDIC SURGERY

## 2021-11-03 PROCEDURE — 73610 X-RAY EXAM OF ANKLE: CPT | Mod: 26,RT,, | Performed by: RADIOLOGY

## 2021-11-03 PROCEDURE — 99999 PR PBB SHADOW E&M-EST. PATIENT-LVL III: CPT | Mod: PBBFAC,,, | Performed by: ORTHOPAEDIC SURGERY

## 2021-11-03 PROCEDURE — 99999 PR PBB SHADOW E&M-EST. PATIENT-LVL III: ICD-10-PCS | Mod: PBBFAC,,, | Performed by: ORTHOPAEDIC SURGERY

## 2021-11-03 PROCEDURE — 73630 X-RAY EXAM OF FOOT: CPT | Mod: TC,PN,RT

## 2021-11-03 PROCEDURE — 73630 XR FOOT COMPLETE 3 VIEW RIGHT: ICD-10-PCS | Mod: 26,RT,, | Performed by: RADIOLOGY

## 2021-11-03 PROCEDURE — 99203 OFFICE O/P NEW LOW 30 MIN: CPT | Mod: S$PBB,57,, | Performed by: ORTHOPAEDIC SURGERY

## 2021-11-29 ENCOUNTER — TELEPHONE (OUTPATIENT)
Dept: ORTHOPEDICS | Facility: CLINIC | Age: 41
End: 2021-11-29
Payer: MEDICARE

## 2021-12-02 DIAGNOSIS — E03.1 CONGENITAL HYPOTHYROIDISM: Primary | ICD-10-CM

## 2021-12-02 RX ORDER — LEVOTHYROXINE SODIUM 125 UG/1
125 TABLET ORAL
Qty: 30 TABLET | Refills: 11 | Status: SHIPPED | OUTPATIENT
Start: 2021-12-02 | End: 2022-11-25

## 2021-12-03 ENCOUNTER — TELEPHONE (OUTPATIENT)
Dept: ENDOCRINOLOGY | Facility: CLINIC | Age: 41
End: 2021-12-03
Payer: MEDICARE

## 2021-12-03 NOTE — TELEPHONE ENCOUNTER
----- Message from Vicki Alexander sent at 12/3/2021 11:51 AM CST -----  Contact: [phone]  Patient Returning Call  Who Called: Pt     Who Left Message for Patient: not sure    Does the patient know what this is regarding?: results    Best Call Back Number:  [phone]

## 2022-01-06 DIAGNOSIS — M25.571 ACUTE RIGHT ANKLE PAIN: Primary | ICD-10-CM

## 2022-01-06 NOTE — TRANSFER OF CARE
"Anesthesia Transfer of Care Note    Patient: Jayde Russ    Procedure(s) Performed: Procedure(s) (LRB):  LIGATION, FALLOPIAN TUBE, POSTPARTUM (Bilateral)    Patient location: PACU    Anesthesia Type: general    Transport from OR: Transported from OR on room air with adequate spontaneous ventilation    Post pain: adequate analgesia    Post assessment: no apparent anesthetic complications    Post vital signs: stable    Level of consciousness: sedated and responds to stimulation    Nausea/Vomiting: no nausea/vomiting    Complications: none    Transfer of care protocol was followed      Last vitals:   Visit Vitals  /84 (BP Location: Left arm, Patient Position: Lying)   Pulse 75   Temp 37.1 °C (98.7 °F) (Oral)   Resp 14   Ht 5' 11" (1.803 m)   Wt 85.7 kg (189 lb)   SpO2 100%   Breastfeeding? Unknown   BMI 26.36 kg/m²     " Impression: Other secondary cataract, bilateral Plan: Patient educated on findings. Opacified capsule not affecting vision. No indication for treatment at this time. Return to clinic if there is a decrease in vision.

## 2022-01-07 ENCOUNTER — OFFICE VISIT (OUTPATIENT)
Dept: ORTHOPEDICS | Facility: CLINIC | Age: 42
End: 2022-01-07
Payer: MEDICARE

## 2022-01-07 ENCOUNTER — HOSPITAL ENCOUNTER (OUTPATIENT)
Dept: RADIOLOGY | Facility: HOSPITAL | Age: 42
Discharge: HOME | End: 2022-01-07
Attending: ORTHOPAEDIC SURGERY
Payer: MEDICARE

## 2022-01-07 VITALS — BODY MASS INDEX: 25.18 KG/M2 | RESPIRATION RATE: 18 BRPM | HEIGHT: 71 IN | WEIGHT: 179.88 LBS

## 2022-01-07 DIAGNOSIS — M25.571 ACUTE RIGHT ANKLE PAIN: ICD-10-CM

## 2022-01-07 DIAGNOSIS — S92.151D DISPLACED AVULSION FRACTURE (CHIP FRACTURE) OF RIGHT TALUS, SUBSEQUENT ENCOUNTER FOR FRACTURE WITH ROUTINE HEALING: Primary | ICD-10-CM

## 2022-01-07 PROCEDURE — 73610 X-RAY EXAM OF ANKLE: CPT | Mod: 26,RT,, | Performed by: RADIOLOGY

## 2022-01-07 PROCEDURE — 99024 POSTOP FOLLOW-UP VISIT: CPT | Mod: POP,,, | Performed by: ORTHOPAEDIC SURGERY

## 2022-01-07 PROCEDURE — 73610 XR ANKLE COMPLETE 3 VIEW RIGHT: ICD-10-PCS | Mod: 26,RT,, | Performed by: RADIOLOGY

## 2022-01-07 PROCEDURE — 99999 PR PBB SHADOW E&M-EST. PATIENT-LVL III: CPT | Mod: PBBFAC,,, | Performed by: ORTHOPAEDIC SURGERY

## 2022-01-07 PROCEDURE — 99999 PR PBB SHADOW E&M-EST. PATIENT-LVL III: ICD-10-PCS | Mod: PBBFAC,,, | Performed by: ORTHOPAEDIC SURGERY

## 2022-01-07 PROCEDURE — 99024 PR POST-OP FOLLOW-UP VISIT: ICD-10-PCS | Mod: POP,,, | Performed by: ORTHOPAEDIC SURGERY

## 2022-01-07 PROCEDURE — 73610 X-RAY EXAM OF ANKLE: CPT | Mod: TC,FY,RT

## 2022-01-07 PROCEDURE — 99213 OFFICE O/P EST LOW 20 MIN: CPT | Mod: PBBFAC | Performed by: ORTHOPAEDIC SURGERY

## 2022-01-07 NOTE — PROGRESS NOTES
Subjective:      Patient ID: Jayde Russ is a 41 y.o. female.    Chief Complaint: Injury of the Right Ankle      HPI:  Ms. Russ returns today for follow-up on a dorsal talus capsular avulsion of her right foot.  She is now approximately 2-1/2 months post injury.  She originally injured her foot on 10/26/2021 after she was walking on stairs and missed the last step injuring her right foot/ankle.  She was treated with a Cam walker.  Today she states she is relatively pain-free.    ROS:  No new diagnosis/surgery/prescriptions since last office visit on 11/03/2021.  Constitutional: Negative for chills and fever.   HENT: Negative for congestion.    Eyes: Negative for blurred vision and pain.   Cardiovascular: Negative for chest pain and cyanosis.   Respiratory: Negative for cough and shortness of breath.    Endocrine: Negative for polydipsia.   Hematologic/Lymphatic: Negative for adenopathy.   Skin: Positive for rash. Negative for flushing.   Musculoskeletal: Positive for joint pain. Negative for gout.   Gastrointestinal: Positive for heartburn. Negative for constipation and diarrhea.   Genitourinary: Negative for nocturia.   Neurological: Positive for headaches. Negative for seizures.   Psychiatric/Behavioral: Positive for depression. Negative for substance abuse. The patient is nervous/anxious.    Allergic/Immunologic: Positive for environmental allergies.       Objective:      Physical Exam:   General: AAOx3.  No acute distress  Vascular:  Pulses intact and equal bilaterally.  Capillary refill less than 3 seconds and equal bilaterally  Neurologic:  Pinprick and soft touch intact and equal bilaterally  Integment:  No ecchymosis, no errythema  Extremity:  Ankle/foot:  Right ankle/foot in Cam walker.  With Cam walker removed:  Dorsiflexion/plantar flexion equal bilaterally.  Inversion/eversion equal bilaterally.  Nontender with dorsiflexion/plantar flexion/inversion/eversion.  Strength equal with resisted  dorsiflexion/plantar flexion/inversion/eversion.  No swelling of the patient's foot.  Nontender with palpation dorsal talus head right foot.  Nontender at the Lisfranc interval bilaterally.  Nontender at the Achilles insertion on the calcaneus bilaterally.  Achilles palpable throughout full distribution bilaterally.  Nontender at the plantar fascia insertion on the calcaneus bilaterally.  Windlass negative bilaterally.  Squeeze test negative bilaterally.  Radiography:  Personally reviewed x-rays of the right ankle completed on 01/07/2022 showed no change in position of a dorsal talus head avulsion fracture with early healing.      Assessment:       Impression:  Healing dorsal talus head avulsion fracture, right foot.      Plan:       1.  Discussed physical examination and radiographic findings with the patient. Jayde understands that she is healing her fracture at this point she can come out of the Cam walker and going to a normal shoe with an arch support.  2. Discontinue cam walker.  Start wearing and normal stiff soled shoe with arch support in the shoe.  3. Recommend the patient purchase arch supports and placed them in her shoes.  4. Any minor pain can be treated with over-the-counter medications dosed per box instructions.  5. Offered referred to physical therapy she declined.  6. May ambulate with weight-bearing at tolerance.  7. Follow up in 1 month with an x-ray of the right ankle expect to discharge the patient to full activities at her next follow-up.

## 2022-01-20 ENCOUNTER — LAB VISIT (OUTPATIENT)
Dept: LAB | Facility: HOSPITAL | Age: 42
End: 2022-01-20
Attending: PHYSICIAN ASSISTANT
Payer: MEDICARE

## 2022-01-20 DIAGNOSIS — E03.1 CONGENITAL HYPOTHYROIDISM: ICD-10-CM

## 2022-01-20 LAB
T4 FREE SERPL-MCNC: 1.16 NG/DL (ref 0.71–1.51)
TSH SERPL DL<=0.005 MIU/L-ACNC: 2.59 UIU/ML (ref 0.4–4)

## 2022-01-20 PROCEDURE — 84439 ASSAY OF FREE THYROXINE: CPT | Performed by: PHYSICIAN ASSISTANT

## 2022-01-20 PROCEDURE — 36415 COLL VENOUS BLD VENIPUNCTURE: CPT | Performed by: PHYSICIAN ASSISTANT

## 2022-01-20 PROCEDURE — 84443 ASSAY THYROID STIM HORMONE: CPT | Performed by: PHYSICIAN ASSISTANT

## 2022-02-03 DIAGNOSIS — S92.151D DISPLACED AVULSION FRACTURE (CHIP FRACTURE) OF RIGHT TALUS, SUBSEQUENT ENCOUNTER FOR FRACTURE WITH ROUTINE HEALING: Primary | ICD-10-CM

## 2022-02-04 ENCOUNTER — HOSPITAL ENCOUNTER (OUTPATIENT)
Dept: RADIOLOGY | Facility: HOSPITAL | Age: 42
Discharge: HOME | End: 2022-02-04
Attending: ORTHOPAEDIC SURGERY
Payer: MEDICARE

## 2022-02-04 DIAGNOSIS — S92.151D DISPLACED AVULSION FRACTURE (CHIP FRACTURE) OF RIGHT TALUS, SUBSEQUENT ENCOUNTER FOR FRACTURE WITH ROUTINE HEALING: ICD-10-CM

## 2022-02-04 PROCEDURE — 73610 XR ANKLE COMPLETE 3 VIEW RIGHT: ICD-10-PCS | Mod: 26,RT,, | Performed by: RADIOLOGY

## 2022-02-04 PROCEDURE — 73610 X-RAY EXAM OF ANKLE: CPT | Mod: 26,RT,, | Performed by: RADIOLOGY

## 2022-02-04 PROCEDURE — 73610 X-RAY EXAM OF ANKLE: CPT | Mod: TC,PN,RT

## 2022-02-08 ENCOUNTER — TELEPHONE (OUTPATIENT)
Dept: ENDOCRINOLOGY | Facility: CLINIC | Age: 42
End: 2022-02-08
Payer: MEDICARE

## 2022-02-08 NOTE — TELEPHONE ENCOUNTER
----- Message from [facility] sent at 2/8/2022 11:25 AM CST -----  .Type: Appointment Request     Caller is requesting appointment for appointment to be moved to a Thursday I only see Monday and Wednesday     Was A Appointment Solution Found When Scheduling? None     Best Call Back Number: [phone]    Additional Information: None

## 2022-03-21 ENCOUNTER — LAB VISIT (OUTPATIENT)
Dept: LAB | Facility: HOSPITAL | Age: 42
End: 2022-03-21
Attending: PHYSICIAN ASSISTANT
Payer: MEDICARE

## 2022-03-21 DIAGNOSIS — E03.1 CONGENITAL HYPOTHYROIDISM: ICD-10-CM

## 2022-03-21 LAB
T4 FREE SERPL-MCNC: 1.25 NG/DL (ref 0.71–1.51)
TSH SERPL DL<=0.005 MIU/L-ACNC: 1.18 UIU/ML (ref 0.4–4)

## 2022-03-21 PROCEDURE — 84443 ASSAY THYROID STIM HORMONE: CPT | Performed by: PHYSICIAN ASSISTANT

## 2022-03-21 PROCEDURE — 84439 ASSAY OF FREE THYROXINE: CPT | Performed by: PHYSICIAN ASSISTANT

## 2022-03-21 PROCEDURE — 36415 COLL VENOUS BLD VENIPUNCTURE: CPT | Performed by: PHYSICIAN ASSISTANT

## 2022-03-28 ENCOUNTER — OFFICE VISIT (OUTPATIENT)
Dept: ENDOCRINOLOGY | Facility: CLINIC | Age: 42
End: 2022-03-28
Payer: MEDICARE

## 2022-03-28 VITALS
OXYGEN SATURATION: 96 % | WEIGHT: 181.88 LBS | HEIGHT: 71 IN | DIASTOLIC BLOOD PRESSURE: 70 MMHG | TEMPERATURE: 98 F | BODY MASS INDEX: 25.46 KG/M2 | SYSTOLIC BLOOD PRESSURE: 110 MMHG | HEART RATE: 86 BPM

## 2022-03-28 DIAGNOSIS — E03.1 CONGENITAL HYPOTHYROIDISM WITHOUT GOITER: Primary | ICD-10-CM

## 2022-03-28 PROCEDURE — 99214 OFFICE O/P EST MOD 30 MIN: CPT | Mod: PBBFAC,PO | Performed by: PHYSICIAN ASSISTANT

## 2022-03-28 PROCEDURE — 99213 OFFICE O/P EST LOW 20 MIN: CPT | Mod: S$PBB,,, | Performed by: PHYSICIAN ASSISTANT

## 2022-03-28 PROCEDURE — 99999 PR PBB SHADOW E&M-EST. PATIENT-LVL IV: ICD-10-PCS | Mod: PBBFAC,,, | Performed by: PHYSICIAN ASSISTANT

## 2022-03-28 PROCEDURE — 99999 PR PBB SHADOW E&M-EST. PATIENT-LVL IV: CPT | Mod: PBBFAC,,, | Performed by: PHYSICIAN ASSISTANT

## 2022-03-28 PROCEDURE — 99213 PR OFFICE/OUTPT VISIT, EST, LEVL III, 20-29 MIN: ICD-10-PCS | Mod: S$PBB,,, | Performed by: PHYSICIAN ASSISTANT

## 2022-03-28 NOTE — PROGRESS NOTES
"CC: Congenital Hypothyroidism    HPI: Jayde Russ is a 41 y.o. female here for congenital hypothyroidism along with pending conditions listed in the Visit Diagnosis.  Arrives with her mother and son. Dx at 8 mo old. No fhx of thyroid disease. +FHx of DM in her mother (borderline) and gf. No hx of iodine supplements. [place]. Diet is low in soy, seafood and cabbage. Thyroid u/s 5/21 did not show any nodules. Taking 137 mcg of LT4 daily. No fatigue, constipation, sweating, diarrhea, hair loss, tremors or palpitations. Cycles are regular. She has three children.     PMHx, PSHx: reviewed in epic.  Social Hx: no ETOH/tobacco use.     Wt Readings from Last 6 Encounters:   01/07/22 81.6 kg (179 lb 14.3 oz)   11/03/21 81.6 kg (180 lb)   10/28/21 81.6 kg (180 lb)   09/28/21 80.7 kg (177 lb 12.8 oz)   08/13/21 81.8 kg (180 lb 5.4 oz)   05/19/21 82.6 kg (182 lb)      ROS:   Constitutional: +wt loss (5 lbs since 5/21).  Eyes: No recent visual changes  Cardiovascular: Denies current anginal symptoms  Respiratory: Denies current respiratory difficulty  Gastrointestinal: Denies recent bowel disturbances  GenitoUrinary - No dysuria  Skin: No new skin rash  Neurologic: No focal neurologic complaints  Musculoskeletal: no joint pain  Endocrine: no polyphagia, polydipsia or polyuria  Remainder ROS negative     /70 (BP Location: Left arm, Patient Position: Sitting, BP Method: Small (Manual))   Pulse 86   Temp 97.7 °F (36.5 °C) (Oral)   Ht 5' 11" (1.803 m)   Wt 82.5 kg (181 lb 14.1 oz)   SpO2 96%   BMI 25.37 kg/m²      Personally reviewed labs below:    Lab Results   Component Value Date    TSH 1.176 03/21/2022    THYROIDAB 11 07/22/2021    FREET4 1.25 03/21/2022        Chemistry        Component Value Date/Time     06/21/2021 1020    K 4.7 06/21/2021 1020     06/21/2021 1020    CO2 26 06/21/2021 1020    BUN 12 06/21/2021 1020    CREATININE 0.81 06/21/2021 1020    GLU 98 06/21/2021 1020        Component " Value Date/Time    CALCIUM 10.0 06/21/2021 1020    AST 19 06/21/2021 1020    ALT 22 06/21/2021 1020    BILITOT 0.5 06/21/2021 1020    EGFRNONAA 90 06/21/2021 1020           No results found for: HGBA1C     PE:  GENERAL: middle aged female, well developed, well nourished  NECK: Supple neck, normal thyroid. No bruit  LYMPHATIC: No cervical or supraclavicular lymphadenopathy  CARDIOVASCULAR: Normal heart sounds, no pedal edema  RESPIRATORY: Normal effort, clear to auscultation  MUSC: 2+ DTR UE/LE  NEURO: steady gait, CN ll-Xll grossly intact  PSYCH: normal mood and affect    US THYROID     CLINICAL HISTORY:  Hypothyroidism, unspecified,     TECHNIQUE:  Ultrasound of the thyroid and cervical lymph nodes was performed.     COMPARISON:  None     FINDINGS:  The right thyroid lobe measures 2.1 x 0.5 x 0.7 cm.  Isthmus measures 2 mm.  The left thyroid lobe is very small and difficult to visualize.  It is estimated to measure 1.7 x 0.8 x 0.6 cm.  No focal nodules are identified.  No cervical adenopathy is seen.     Impression:     Very small, atrophic thyroid gland.        Electronically signed by: Amber Barnett  Date:                                            05/28/2021  Time:                                           16:29    Assessment/Plan:   1. Congenital hypothyroidism without goiter  T4, Free    TSH      Hypothyroidism-TFTs wnl. Continue LT4 dose.     F/u in one year w/ labs prior-tsh,t4

## 2022-10-08 ENCOUNTER — HOSPITAL ENCOUNTER (EMERGENCY)
Facility: HOSPITAL | Age: 42
Discharge: HOME | End: 2022-10-08
Attending: INTERNAL MEDICINE
Payer: MEDICARE

## 2022-10-08 VITALS
RESPIRATION RATE: 20 BRPM | WEIGHT: 180 LBS | HEART RATE: 89 BPM | OXYGEN SATURATION: 98 % | TEMPERATURE: 99 F | BODY MASS INDEX: 26.66 KG/M2 | DIASTOLIC BLOOD PRESSURE: 97 MMHG | HEIGHT: 69 IN | SYSTOLIC BLOOD PRESSURE: 129 MMHG

## 2022-10-08 DIAGNOSIS — E83.52 HYPERCALCEMIA: Primary | ICD-10-CM

## 2022-10-08 DIAGNOSIS — N20.0 BILATERAL RENAL STONES: ICD-10-CM

## 2022-10-08 LAB
ALBUMIN SERPL BCP-MCNC: 4 G/DL (ref 3.5–5.2)
ALP SERPL-CCNC: 90 U/L (ref 55–135)
ALT SERPL W/O P-5'-P-CCNC: 41 U/L (ref 10–44)
AMORPH CRY URNS QL MICRO: ABNORMAL
ANION GAP SERPL CALC-SCNC: 12 MMOL/L (ref 8–16)
AST SERPL-CCNC: 68 U/L (ref 10–40)
B-HCG UR QL: NEGATIVE
BACTERIA #/AREA URNS HPF: ABNORMAL /HPF
BASOPHILS # BLD AUTO: 0.05 K/UL (ref 0–0.2)
BASOPHILS NFR BLD: 0.5 % (ref 0–1.9)
BILIRUB SERPL-MCNC: 0.7 MG/DL (ref 0.1–1)
BILIRUB UR QL STRIP: NEGATIVE
BUN SERPL-MCNC: 14 MG/DL (ref 6–20)
CALCIUM SERPL-MCNC: 11.1 MG/DL (ref 8.7–10.5)
CHLORIDE SERPL-SCNC: 105 MMOL/L (ref 95–110)
CLARITY UR: CLEAR
CO2 SERPL-SCNC: 23 MMOL/L (ref 23–29)
COLOR UR: YELLOW
CREAT SERPL-MCNC: 0.9 MG/DL (ref 0.5–1.4)
DIFFERENTIAL METHOD: ABNORMAL
EOSINOPHIL # BLD AUTO: 0.1 K/UL (ref 0–0.5)
EOSINOPHIL NFR BLD: 0.7 % (ref 0–8)
ERYTHROCYTE [DISTWIDTH] IN BLOOD BY AUTOMATED COUNT: 12.3 % (ref 11.5–14.5)
EST. GFR  (NO RACE VARIABLE): >60 ML/MIN/1.73 M^2
GLUCOSE SERPL-MCNC: 164 MG/DL (ref 70–110)
GLUCOSE UR QL STRIP: NEGATIVE
HCT VFR BLD AUTO: 37.6 % (ref 37–48.5)
HGB BLD-MCNC: 13 G/DL (ref 12–16)
HGB UR QL STRIP: ABNORMAL
IMM GRANULOCYTES # BLD AUTO: 0.03 K/UL (ref 0–0.04)
IMM GRANULOCYTES NFR BLD AUTO: 0.3 % (ref 0–0.5)
KETONES UR QL STRIP: NEGATIVE
LEUKOCYTE ESTERASE UR QL STRIP: NEGATIVE
LYMPHOCYTES # BLD AUTO: 1.6 K/UL (ref 1–4.8)
LYMPHOCYTES NFR BLD: 14.7 % (ref 18–48)
MAGNESIUM SERPL-MCNC: 1.8 MG/DL (ref 1.6–2.6)
MCH RBC QN AUTO: 28.5 PG (ref 27–31)
MCHC RBC AUTO-ENTMCNC: 34.6 G/DL (ref 32–36)
MCV RBC AUTO: 83 FL (ref 82–98)
MICROSCOPIC COMMENT: ABNORMAL
MONOCYTES # BLD AUTO: 0.8 K/UL (ref 0.3–1)
MONOCYTES NFR BLD: 7.5 % (ref 4–15)
NEUTROPHILS # BLD AUTO: 8.2 K/UL (ref 1.8–7.7)
NEUTROPHILS NFR BLD: 76.3 % (ref 38–73)
NITRITE UR QL STRIP: NEGATIVE
NRBC BLD-RTO: 0 /100 WBC
PH UR STRIP: 7 [PH] (ref 5–8)
PLATELET # BLD AUTO: 209 K/UL (ref 150–450)
PMV BLD AUTO: 10.2 FL (ref 9.2–12.9)
POTASSIUM SERPL-SCNC: 3.7 MMOL/L (ref 3.5–5.1)
PROT SERPL-MCNC: 7.8 G/DL (ref 6–8.4)
PROT UR QL STRIP: NEGATIVE
RBC # BLD AUTO: 4.56 M/UL (ref 4–5.4)
RBC #/AREA URNS HPF: 10 /HPF (ref 0–4)
SODIUM SERPL-SCNC: 140 MMOL/L (ref 136–145)
SP GR UR STRIP: 1.02 (ref 1–1.03)
URN SPEC COLLECT METH UR: ABNORMAL
UROBILINOGEN UR STRIP-ACNC: 1 EU/DL
WBC # BLD AUTO: 10.69 K/UL (ref 3.9–12.7)
WBC #/AREA URNS HPF: 4 /HPF (ref 0–5)

## 2022-10-08 PROCEDURE — 81025 URINE PREGNANCY TEST: CPT | Performed by: INTERNAL MEDICINE

## 2022-10-08 PROCEDURE — 99285 EMERGENCY DEPT VISIT HI MDM: CPT | Mod: 25

## 2022-10-08 PROCEDURE — 74176 CT ABD & PELVIS W/O CONTRAST: CPT | Mod: TC

## 2022-10-08 PROCEDURE — 83970 ASSAY OF PARATHORMONE: CPT | Performed by: INTERNAL MEDICINE

## 2022-10-08 PROCEDURE — 36415 COLL VENOUS BLD VENIPUNCTURE: CPT | Performed by: INTERNAL MEDICINE

## 2022-10-08 PROCEDURE — 74176 CT RENAL STONE STUDY ABD PELVIS WO: ICD-10-PCS | Mod: 26,,, | Performed by: RADIOLOGY

## 2022-10-08 PROCEDURE — 83735 ASSAY OF MAGNESIUM: CPT | Performed by: INTERNAL MEDICINE

## 2022-10-08 PROCEDURE — 96360 HYDRATION IV INFUSION INIT: CPT

## 2022-10-08 PROCEDURE — 25000003 PHARM REV CODE 250: Performed by: FAMILY MEDICINE

## 2022-10-08 PROCEDURE — 85025 COMPLETE CBC W/AUTO DIFF WBC: CPT | Performed by: INTERNAL MEDICINE

## 2022-10-08 PROCEDURE — 81000 URINALYSIS NONAUTO W/SCOPE: CPT | Performed by: INTERNAL MEDICINE

## 2022-10-08 PROCEDURE — 74176 CT ABD & PELVIS W/O CONTRAST: CPT | Mod: 26,,, | Performed by: RADIOLOGY

## 2022-10-08 PROCEDURE — 80053 COMPREHEN METABOLIC PANEL: CPT | Performed by: INTERNAL MEDICINE

## 2022-10-08 RX ADMIN — SODIUM CHLORIDE 1000 ML: 9 INJECTION, SOLUTION INTRAVENOUS at 07:10

## 2022-10-08 NOTE — ED PROVIDER NOTES
Encounter Date: 10/8/2022       History     Chief Complaint   Patient presents with    Abdominal Pain     X 1 hour PTA      Patient comes in complaining of lower abdominal pain and dysuria x1 hour.  No nausea vomiting fever chills.  History of urinary tract infection the past.  Patient normal cycle last month bed was abnormal before then.      Review of patient's allergies indicates:   Allergen Reactions    Pcn [penicillins]      Past Medical History:   Diagnosis Date    Abnormal Pap smear of cervix     When 18 years old; had to have surgery    AMA (advanced maternal age) multigravida 35+, second trimester     Asthma     Depression affecting pregnancy in second trimester, antepartum     Hypothyroidism      Past Surgical History:   Procedure Laterality Date    POSTPARTUM LIGATION OF FALLOPIAN TUBE Bilateral 2/24/2020    Procedure: LIGATION, FALLOPIAN TUBE, POSTPARTUM;  Surgeon: Rangel Rosa MD;  Location: [facility] OR;  Service: OB/GYN;  Laterality: Bilateral;    TYMPANOSTOMY TUBE PLACEMENT       Family History   Problem Relation Age of Onset    Hypertension Mother     Arthritis Maternal Aunt     Cancer Maternal Grandmother     Breast cancer Maternal Cousin      Social History     Tobacco Use    Smoking status: Never    Smokeless tobacco: Never   Substance Use Topics    Alcohol use: Never    Drug use: Never     Review of Systems   Constitutional:  Negative for fever.   HENT:  Negative for sore throat.    Respiratory:  Negative for shortness of breath.    Cardiovascular:  Negative for chest pain.   Gastrointestinal:  Negative for nausea.   Genitourinary:  Negative for dysuria.   Musculoskeletal:  Negative for back pain.   Skin:  Negative for rash.   Neurological:  Negative for weakness.   Hematological:  Does not bruise/bleed easily.     Physical Exam     Initial Vitals   BP Pulse Resp Temp SpO2   10/08/22 1600 10/08/22 1600 10/08/22 1600 10/08/22 1604 10/08/22 1600   115/77 84 18 98.7 °F (37.1 °C) 99 %      MAP       --                 Physical Exam    Vitals reviewed.  Constitutional: She appears well-developed.   Eyes: Pupils are equal, round, and reactive to light.   Neck:   Normal range of motion.  Cardiovascular:  Normal rate.           Pulmonary/Chest: Breath sounds normal.   Abdominal: Abdomen is soft. Bowel sounds are normal. There is abdominal tenderness in the suprapubic area. There is no guarding.   Musculoskeletal:         General: Normal range of motion.      Cervical back: Normal range of motion.     Neurological: She is alert.   Skin: Skin is warm.   Psychiatric: She has a normal mood and affect.       ED Course   Procedures  Labs Reviewed   URINALYSIS, REFLEX TO URINE CULTURE - Abnormal; Notable for the following components:       Result Value    Occult Blood UA 1+ (*)     All other components within normal limits    Narrative:     Preferred Collection Type->Urine, Clean Catch  Specimen Source->Urine   CBC W/ AUTO DIFFERENTIAL - Abnormal; Notable for the following components:    Gran # (ANC) 8.2 (*)     Gran % 76.3 (*)     Lymph % 14.7 (*)     All other components within normal limits   URINALYSIS MICROSCOPIC - Abnormal; Notable for the following components:    RBC, UA 10 (*)     Bacteria Many (*)     Amorphous, UA Many (*)     All other components within normal limits    Narrative:     Preferred Collection Type->Urine, Clean Catch  Specimen Source->Urine   COMPREHENSIVE METABOLIC PANEL - Abnormal; Notable for the following components:    Glucose 164 (*)     Calcium 11.1 (*)     AST 68 (*)     All other components within normal limits   PREGNANCY TEST, URINE RAPID    Narrative:     Specimen Source->Urine   COMPREHENSIVE METABOLIC PANEL   MAGNESIUM   PTH, INTACT   MAGNESIUM   PTH, INTACT          Imaging Results              CT Renal Stone Study ABD Pelvis WO (In process)                   X-Rays:   Independently Interpreted Readings:   Other Readings:  PROCEDURE INFORMATION:  Exam: CT Abdomen And Pelvis Without  Contrast  Exam date and time: 10/8/2022 4:48 PM  Age: 42 years old  Clinical indication: Abdominal pain; Generalized  TECHNIQUE:  Imaging protocol: Computed tomography of the abdomen and pelvis without contrast.  Radiation optimization: All CT scans at this facility use at least one of these dose optimization  techniques: automated exposure control; mA and/or kV adjustment per patient size (includes targeted  exams where dose is matched to clinical indication); or iterative reconstruction.  COMPARISON:  No relevant prior studies available.  FINDINGS:  Liver: Normal. No mass.  Gallbladder and bile ducts: No calcified stones. No ductal dilation.  Pancreas: Normal. No ductal dilation.  Spleen: Normal. No splenomegaly.  Adrenal glands: No mass.  Kidneys and ureters: Multiple bilateral renal stones measure up to 5 mm with multiple cloudlike  nephro medullary calcifications. No renal mass. No hydronephrosis. No obstructing stones.  Stomach and bowel: No obstruction. No mucosal thickening.  Appendix: The appendix appears normal.  Intraperitoneal space: No free air. No fluid collection.  Vasculature: No abdominal aortic aneurysm.  Lymph nodes: No enlarged lymph nodes.  Urinary bladder: Unremarkable as visualized.  Reproductive: Unremarkable as visualized.  Bones/joints: No acute fracture. Normal bone mineralization overall.  Soft tissues: Unremarkable.  IMPRESSION:  1. There are multiple non-obstructing renal stones.  2. Findings are suggestive of medullary nephrocalcinosis. Correlate clinically for hyperparathyroidism,  renal tubular acidosis, medullary sponge kidney, or other cause of hypercalcemia.  MACO VILLARREAL Accession: [id number] MRN: [id number]  Preliminary Radiology Report   (QA) DISCREPANCY?  If there is a discrepancy between the preliminary and final interpretation, please notify [facility] via [email or web address].  If you do not have access to our QA portal, call our QA team at  [phone]  CONFIDENTIALITY STATEMENT  This report is intended only for the use of the referring physician, and only in accordance with law, If you received this in error, call [phone]  Page 2 of 2  Thank you for allowing us to participate in the care of your patient.  Dictated and Authenticated by: Adrian Persaud MD  10/08/2022 6:33 PM Central Time ([place] & [place]  Medications   sodium chloride 0.9% bolus 1,000 mL (1,000 mLs Intravenous New Bag 10/8/22 1956)     Medical Decision Making:   ED Management:  I discussed lab findings with the patient.  Discussed the fact that she does have nonobstructive renal stones.  Patient admits to taking Caltrate on a regular basis for recent ankle injury.  I advised patient to stop taking Caltrate.  I also recommend the patient follow up with her primary care physician to recheck her calcium levels.  Patient voiced understanding and had no further questions.  Patient was discharged that time.  I did give an additional L of fluid to help patient rehydrated.  I talked about increasing hydration decreasing caffeinated drinks as well.  Patient voiced understanding.           ED Course as of 10/08/22 2018   Sat Oct 08, 2022   1634 Urinalysis, Reflex to Urine Culture Urine, Clean Catch(!) [PW]   1634 Urinalysis Microscopic(!) [PW]   1634 RBC, UA(!): 10 [PW]   1634 Bacteria, UA(!): Many [PW]   1634 Amorphous, UA(!): Many [PW]   1634 Preg Test, Ur: Negative [PW]   1650 CBC auto differential(!) [PW]      ED Course User Index  [PW] Cristian Bacon MD                 Clinical Impression:   Final diagnoses:  [E83.52] Hypercalcemia (Primary)  [N20.0] Bilateral renal stones - Non obstructing      ED Disposition Condition    Discharge Stable          ED Prescriptions    None       Follow-up Information    None     Follow up with primary care physician next 2 weeks.     Evelio Whitaker MD  10/08/22 2018

## 2022-10-09 LAB — PTH-INTACT SERPL-MCNC: 16.2 PG/ML (ref 9–77)

## 2022-10-09 NOTE — DISCHARGE INSTRUCTIONS
Follow up with her primary care physician in the next 2 weeks to recheck calcium level.  Return to ER as needed.  Please stop taking Caltrate.

## 2022-11-09 ENCOUNTER — HOSPITAL ENCOUNTER (OUTPATIENT)
Dept: RADIOLOGY | Facility: HOSPITAL | Age: 42
Discharge: HOME | End: 2022-11-09
Payer: MEDICARE

## 2022-11-09 DIAGNOSIS — Z12.31 ENCOUNTER FOR SCREENING MAMMOGRAM FOR BREAST CANCER: ICD-10-CM

## 2022-11-09 PROCEDURE — 77063 MAMMO DIGITAL SCREENING BILAT WITH TOMO: ICD-10-PCS | Mod: 26,,, | Performed by: RADIOLOGY

## 2022-11-09 PROCEDURE — 77067 MAMMO DIGITAL SCREENING BILAT WITH TOMO: ICD-10-PCS | Mod: 26,,, | Performed by: RADIOLOGY

## 2022-11-09 PROCEDURE — 77067 SCR MAMMO BI INCL CAD: CPT | Mod: 26,,, | Performed by: RADIOLOGY

## 2022-11-09 PROCEDURE — 77063 BREAST TOMOSYNTHESIS BI: CPT | Mod: TC

## 2022-11-09 PROCEDURE — 77063 BREAST TOMOSYNTHESIS BI: CPT | Mod: 26,,, | Performed by: RADIOLOGY

## 2022-11-09 PROCEDURE — 77067 SCR MAMMO BI INCL CAD: CPT | Mod: TC

## 2023-03-23 ENCOUNTER — LAB VISIT (OUTPATIENT)
Dept: LAB | Facility: HOSPITAL | Age: 43
End: 2023-03-23
Attending: PHYSICIAN ASSISTANT
Payer: MEDICARE

## 2023-03-23 DIAGNOSIS — E03.1 CONGENITAL HYPOTHYROIDISM WITHOUT GOITER: ICD-10-CM

## 2023-03-23 LAB
T4 FREE SERPL-MCNC: 1.24 NG/DL (ref 0.71–1.51)
TSH SERPL DL<=0.005 MIU/L-ACNC: 2.29 UIU/ML (ref 0.4–4)

## 2023-03-23 PROCEDURE — 84443 ASSAY THYROID STIM HORMONE: CPT | Performed by: PHYSICIAN ASSISTANT

## 2023-03-23 PROCEDURE — 84439 ASSAY OF FREE THYROXINE: CPT | Performed by: PHYSICIAN ASSISTANT

## 2023-03-23 PROCEDURE — 36415 COLL VENOUS BLD VENIPUNCTURE: CPT | Performed by: PHYSICIAN ASSISTANT

## 2023-03-30 ENCOUNTER — OFFICE VISIT (OUTPATIENT)
Dept: ENDOCRINOLOGY | Facility: CLINIC | Age: 43
End: 2023-03-30
Payer: MEDICARE

## 2023-03-30 VITALS
HEIGHT: 69 IN | HEART RATE: 83 BPM | DIASTOLIC BLOOD PRESSURE: 70 MMHG | BODY MASS INDEX: 25.96 KG/M2 | SYSTOLIC BLOOD PRESSURE: 110 MMHG | TEMPERATURE: 98 F | WEIGHT: 175.25 LBS | OXYGEN SATURATION: 96 %

## 2023-03-30 DIAGNOSIS — E03.1 CONGENITAL HYPOTHYROIDISM WITHOUT GOITER: Primary | ICD-10-CM

## 2023-03-30 PROCEDURE — 99214 OFFICE O/P EST MOD 30 MIN: CPT | Mod: PBBFAC,PO | Performed by: PHYSICIAN ASSISTANT

## 2023-03-30 PROCEDURE — 99213 PR OFFICE/OUTPT VISIT, EST, LEVL III, 20-29 MIN: ICD-10-PCS | Mod: S$PBB,,, | Performed by: PHYSICIAN ASSISTANT

## 2023-03-30 PROCEDURE — 99213 OFFICE O/P EST LOW 20 MIN: CPT | Mod: S$PBB,,, | Performed by: PHYSICIAN ASSISTANT

## 2023-03-30 PROCEDURE — 99999 PR PBB SHADOW E&M-EST. PATIENT-LVL IV: ICD-10-PCS | Mod: PBBFAC,,, | Performed by: PHYSICIAN ASSISTANT

## 2023-03-30 PROCEDURE — 99999 PR PBB SHADOW E&M-EST. PATIENT-LVL IV: CPT | Mod: PBBFAC,,, | Performed by: PHYSICIAN ASSISTANT

## 2023-03-30 NOTE — PROGRESS NOTES
"CC: Congenital Hypothyroidism    HPI: Jayde Russ is a 42 y.o. female here for congenital hypothyroidism along with pending conditions listed in the Visit Diagnosis. Arrives with her mother and son. Dx at 8 mo old. No fhx of thyroid disease. +FHx of DM in her mother (borderline) and gf. No hx of iodine supplements. [place] Thyroid u/s 5/21 did not show any nodules. Taking 125 mcg of LT4 daily. No fatigue, constipation, sweating, diarrhea, hair loss, tremors or palpitations. Cycles are regular. She has three children.     PMHx, PSHx: reviewed in epic.  Social Hx: no ETOH/tobacco use.     Wt Readings from Last 6 Encounters:   03/30/23 79.5 kg (175 lb 4.3 oz)   10/18/22 82.6 kg (182 lb)   10/08/22 81.6 kg (180 lb)   03/28/22 82.5 kg (181 lb 14.1 oz)   01/07/22 81.6 kg (179 lb 14.3 oz)   11/03/21 81.6 kg (180 lb)      ROS:   Constitutional: +wt loss (6 lbs since 5/21).  Eyes: No recent visual changes  Cardiovascular: Denies current anginal symptoms  Respiratory: Denies current respiratory difficulty  Gastrointestinal: Denies recent bowel disturbances  GenitoUrinary - No dysuria  Skin: No new skin rash  Neurologic: No focal neurologic complaints  Musculoskeletal: no joint pain  Endocrine: no polyphagia, polydipsia or polyuria  Remainder ROS negative     /70 (BP Location: Left arm, Patient Position: Sitting, BP Method: Small (Manual))   Pulse 83   Temp 97.8 °F (36.6 °C) (Oral)   Ht 5' 9" (1.753 m)   Wt 79.5 kg (175 lb 4.3 oz)   SpO2 96%   BMI 25.88 kg/m²      Personally reviewed labs below:    Lab Results   Component Value Date    TSH 2.286 03/23/2023    THYROIDAB 11 07/22/2021    FREET4 1.24 03/23/2023        Chemistry        Component Value Date/Time     10/08/2022 1617    K 3.7 10/08/2022 1617     10/08/2022 1617    CO2 23 10/08/2022 1617    BUN 14 10/08/2022 1617    CREATININE 0.9 10/08/2022 1617    GLU 90 10/18/2022 0847        Component Value Date/Time    CALCIUM 11.1 (H) " 10/08/2022 1617    ALKPHOS 90 10/08/2022 1617    AST 68 (H) 10/08/2022 1617    ALT 41 10/08/2022 1617    BILITOT 0.7 10/08/2022 1617    EGFRNONAA 90 06/21/2021 1020           No results found for: HGBA1C     PE:  GENERAL: middle aged female, well developed, well nourished  NECK: Supple neck, normal thyroid. No bruit  LYMPHATIC: No cervical or supraclavicular lymphadenopathy  CARDIOVASCULAR: Normal heart sounds, no pedal edema  RESPIRATORY: Normal effort, clear to auscultation  MUSC: 2+ DTR UE/LE  NEURO: steady gait, CN ll-Xll grossly intact  PSYCH: normal mood and affect    US THYROID     CLINICAL HISTORY:  Hypothyroidism, unspecified,     TECHNIQUE:  Ultrasound of the thyroid and cervical lymph nodes was performed.     COMPARISON:  None     FINDINGS:  The right thyroid lobe measures 2.1 x 0.5 x 0.7 cm.  Isthmus measures 2 mm.  The left thyroid lobe is very small and difficult to visualize.  It is estimated to measure 1.7 x 0.8 x 0.6 cm.  No focal nodules are identified.  No cervical adenopathy is seen.     Impression:     Very small, atrophic thyroid gland.        Electronically signed by: Amber Barnett  Date:                                            05/28/2021  Time:                                           16:29    Assessment/Plan:   1. Congenital hypothyroidism without goiter  T4, Free    TSH         Hypothyroidism-TFTs wnl. Continue LT4 dose.     F/u in one year w/ labs prior-tsh,t4

## 2023-11-17 DIAGNOSIS — E03.1 CONGENITAL HYPOTHYROIDISM: ICD-10-CM

## 2023-11-17 RX ORDER — LEVOTHYROXINE SODIUM 125 UG/1
125 TABLET ORAL
Qty: 30 TABLET | Refills: 11 | Status: SHIPPED | OUTPATIENT
Start: 2023-11-17

## 2024-01-17 ENCOUNTER — HOSPITAL ENCOUNTER (OUTPATIENT)
Dept: RADIOLOGY | Facility: HOSPITAL | Age: 44
Discharge: HOME | End: 2024-01-17
Attending: NURSE PRACTITIONER
Payer: MEDICAID

## 2024-01-17 DIAGNOSIS — Z12.31 ENCOUNTER FOR SCREENING MAMMOGRAM FOR BREAST CANCER: ICD-10-CM

## 2024-01-17 PROCEDURE — 77067 SCR MAMMO BI INCL CAD: CPT | Mod: TC

## 2024-01-17 PROCEDURE — 77063 BREAST TOMOSYNTHESIS BI: CPT | Mod: 26,,, | Performed by: RADIOLOGY

## 2024-01-17 PROCEDURE — 77067 SCR MAMMO BI INCL CAD: CPT | Mod: 26,,, | Performed by: RADIOLOGY

## 2024-08-15 ENCOUNTER — HOSPITAL ENCOUNTER (OUTPATIENT)
Dept: RADIOLOGY | Facility: HOSPITAL | Age: 44
Discharge: HOME | End: 2024-08-15
Attending: STUDENT IN AN ORGANIZED HEALTH CARE EDUCATION/TRAINING PROGRAM
Payer: MEDICARE

## 2024-08-15 ENCOUNTER — OFFICE VISIT (OUTPATIENT)
Dept: FAMILY MEDICINE | Facility: CLINIC | Age: 44
End: 2024-08-15
Payer: MEDICARE

## 2024-08-15 VITALS
BODY MASS INDEX: 25.12 KG/M2 | OXYGEN SATURATION: 96 % | TEMPERATURE: 99 F | HEART RATE: 103 BPM | SYSTOLIC BLOOD PRESSURE: 120 MMHG | HEIGHT: 69 IN | WEIGHT: 169.63 LBS | DIASTOLIC BLOOD PRESSURE: 76 MMHG

## 2024-08-15 DIAGNOSIS — F32.A ANXIETY AND DEPRESSION: ICD-10-CM

## 2024-08-15 DIAGNOSIS — R79.9 ABNORMAL BLOOD CHEMISTRY: ICD-10-CM

## 2024-08-15 DIAGNOSIS — A49.9 BACTERIAL INFECTION: ICD-10-CM

## 2024-08-15 DIAGNOSIS — F41.9 ANXIETY AND DEPRESSION: ICD-10-CM

## 2024-08-15 DIAGNOSIS — E03.9 ACQUIRED HYPOTHYROIDISM: ICD-10-CM

## 2024-08-15 DIAGNOSIS — Z11.59 ENCOUNTER FOR SCREENING FOR VIRAL DISEASE: ICD-10-CM

## 2024-08-15 DIAGNOSIS — A49.9 BACTERIAL INFECTION: Primary | ICD-10-CM

## 2024-08-15 LAB
CTP QC/QA: YES
SARS-COV-2 RDRP RESP QL NAA+PROBE: NEGATIVE

## 2024-08-15 PROCEDURE — 71046 X-RAY EXAM CHEST 2 VIEWS: CPT | Mod: 26,,, | Performed by: RADIOLOGY

## 2024-08-15 PROCEDURE — 99213 OFFICE O/P EST LOW 20 MIN: CPT | Mod: PBBFAC | Performed by: STUDENT IN AN ORGANIZED HEALTH CARE EDUCATION/TRAINING PROGRAM

## 2024-08-15 PROCEDURE — 87635 SARS-COV-2 COVID-19 AMP PRB: CPT | Mod: PBBFAC | Performed by: STUDENT IN AN ORGANIZED HEALTH CARE EDUCATION/TRAINING PROGRAM

## 2024-08-15 PROCEDURE — 71046 X-RAY EXAM CHEST 2 VIEWS: CPT | Mod: TC

## 2024-08-15 PROCEDURE — 99999PBSHW: Mod: PBBFAC,,,

## 2024-08-15 PROCEDURE — 99999 PR PBB SHADOW E&M-EST. PATIENT-LVL III: CPT | Mod: PBBFAC,,, | Performed by: STUDENT IN AN ORGANIZED HEALTH CARE EDUCATION/TRAINING PROGRAM

## 2024-08-15 RX ORDER — BENZONATATE 200 MG/1
200 CAPSULE ORAL 3 TIMES DAILY PRN
Qty: 20 CAPSULE | Refills: 0 | Status: SHIPPED | OUTPATIENT
Start: 2024-08-15 | End: 2024-08-30

## 2024-08-15 RX ORDER — GUAIFENESIN AND DEXTROMETHORPHAN HYDROBROMIDE 10; 100 MG/5ML; MG/5ML
5 SYRUP ORAL EVERY 6 HOURS PRN
COMMUNITY
Start: 2024-08-15 | End: 2024-08-25

## 2024-08-15 RX ORDER — AZITHROMYCIN 250 MG/1
TABLET, FILM COATED ORAL
Qty: 6 TABLET | Refills: 0 | Status: SHIPPED | OUTPATIENT
Start: 2024-08-15 | End: 2024-08-20

## 2024-08-15 RX ORDER — PREDNISONE 20 MG/1
20 TABLET ORAL DAILY
Qty: 4 TABLET | Refills: 0 | Status: SHIPPED | OUTPATIENT
Start: 2024-08-15

## 2024-08-15 RX ORDER — FLUTICASONE PROPIONATE AND SALMETEROL 250; 50 UG/1; UG/1
1 POWDER RESPIRATORY (INHALATION) 2 TIMES DAILY
Qty: 8.5 EACH | Refills: 0 | Status: SHIPPED | OUTPATIENT
Start: 2024-08-15 | End: 2025-08-15

## 2024-08-15 RX ORDER — FLUTICASONE PROPIONATE 50 MCG
1 SPRAY, SUSPENSION (ML) NASAL DAILY
Qty: 18.2 ML | Refills: 0 | Status: SHIPPED | OUTPATIENT
Start: 2024-08-15

## 2024-08-15 RX ORDER — AZELASTINE 1 MG/ML
1 SPRAY, METERED NASAL 2 TIMES DAILY
Qty: 18.2 ML | Refills: 0 | Status: SHIPPED | OUTPATIENT
Start: 2024-08-15 | End: 2025-08-15

## 2024-08-15 NOTE — PROGRESS NOTES
SUBJECTIVE:    CHIEF COMPLAINT:   Chief Complaint   Patient presents with    Cough    Nasal Congestion     fev    Fever     X 1 week            274}    HISTORY OF PRESENT ILLNESS:  Jayde Russ is a 44 y.o. female who presents to the clinic today to establish care and annual check     Patient c/o 1 week h/o of productive cough, subjective fevers, sneezing, and congestion. Patient has seasonal asthma and allergies which she uses albuterol inhalers and nebulizer which helps. Patient also c/o wheezing    Patient was previously f/u w/ endocrinologist for thyroid but insurance would not cover the specialist anymore. OBGYN has been monitoring it.     Patient has life stressors and is on wellbutrin for depression which is helping.       PAST MEDICAL HISTORY:     274}  Past Medical History:   Diagnosis Date    Abnormal Pap smear of cervix     When 18 years old; had to have surgery    AMA (advanced maternal age) multigravida 35+, second trimester     Asthma     Depression affecting pregnancy in second trimester, antepartum     Hypothyroidism        PAST SURGICAL HISTORY:  Past Surgical History:   Procedure Laterality Date    POSTPARTUM LIGATION OF FALLOPIAN TUBE Bilateral 2/24/2020    Procedure: LIGATION, FALLOPIAN TUBE, POSTPARTUM;  Surgeon: Rangel Rosa MD;  Location: [facility];  Service: OB/GYN;  Laterality: Bilateral;    TYMPANOSTOMY TUBE PLACEMENT         SOCIAL HISTORY:  Social History     Socioeconomic History    Marital status:    Tobacco Use    Smoking status: Never    Smokeless tobacco: Never   Substance and Sexual Activity    Alcohol use: Never    Drug use: Never    Sexual activity: Yes     Birth control/protection: See Surgical Hx   Social History Narrative    Plans from [facility]     OB Visit 20162/20/2020     IUP @ 38 6/7    AMA    Congenital hypothyroidism    Undesired fertility    Asthma        Visit Summary:    BPP: 8/8; ira 16    SVE: 2/60/-2/vertex        Doing well        Plan:    Return for  follow up appointment in 1 week with BPP/US for growth    [facility] BID        Halle Gentile NP     OB Visit 20162/13/2020     IUP @ 37 6/7    AMA    Hypothyroidism    Undesired fertility    Asthma        Visit Summary:    BPP: 8/8; TASNEEM within normal limits    Vertex    SVE: 1-2/60/-2        Plan:    Return for follow up appointment in 1 week with NST    Anesthesia consult today    [facility] BID        Halle Gentile NP     OB Visit 20162/4/2020     IUP @ 36/4 weeks    AMA    Hypothyroidism    Undesired Fertility    Asthma        Visit Summary    Growth scan within normal limits    Vertex    BPP 8/8, NST reactive = 10/10    GBS today    TSH today    Kick counts BID        Return for follow up appointment in one week with NST.     OB Visit 20161/30/2020     IUP @ 35 6/7    AMA    Hypothyroidism    Undesired fertility    Asthma        Visit Summary:    BPP: 8/8; TASNEEM 10    Doing well        SVE: LTC        Plan:    Return for follow up appointment in 1 week with NST with TASNEEM and GBBS    [facility] BID    Repeat TSH 02/13        Halle Gentile NP     OB Visit 20161/23/2020     IUP @ 34 6/7    AMA    Hypothyroidism    Undesired fertility    Asthma        Visit Summary:    TSH was 2.2- synthroid increased to 200 mcg    BPP: 8/8; TASNEEM 13    Doing well        Plan:    Return for follow up appointment in 1 week with TASNEEM and NST    [facility] BID    Repeat TSH 02/13        Halle Gentile NP     OB Visit 20161/16/2020     IUP @ 33 5/7    AMA    Hypothyroidism    Undesired fertility    Asthma        Visit Summary:    TSH today    U/S: growth: 21%; TASNEEM 9, but MVP 3 cm    Doing well        Plan:    Return for follow up appointment in 1 week with TASNEEM and NST    [facility] BID        Halle Gentile NP     OB Visit 201612/31/2019     IUP @ 31 weeks    AMA    Hypothyroidism    Undesired Fertility    Asthma        Visit Summary    Pt had Synthroid adjusted 2 weeks ago.    Pt has OF appt scheduled but may cancel r/t insurance not covering appts.    Doing well     Anesthesia consult        Return for follow up appointment in 2weeks for growth scan and appt with Dr Roberts.     OB Visit 201612/12/2019     IUP @ 28 weeks    AMA    Hypothyroidism    Undesired fertility    Asthma        Visit Summary:    TSH, CBC, 1 hour GTT, RPR, HIV 1/2        US within normal limits        Tdap        Plan:    Return for follow up appointment in 2 weeks    [facility] BID    Keep [facility] appointment        Halle Gentile NP     OB Visit 201611/14/2019     IUP.  AMA.  Hypothyroid.  Undesired fertility.  Asthma.    Patient went to [facility] yesterday.  Reviewed recommendations.    Return to clinic in 4 weeks.    Will do CBC/DM/HIV/RPR/Tdap.    Growth scan in 4 weeks.    URI has improved.     OB Visit 201611/7/2019     IUP. AMA. Hypothyroidism. URI.    STREP and FLU neg/ neg    Mucinex D    Delsym OTC    Return for follow up appointment 1 weeks     OB Visit 201610/17/2019     IUP 20 6/7    AMA    Congenital hypothyroidism    Undesired fertility    Asthma- well controlled        Visit Summary:    PW today    BTL consents signed        Patient doing well on new synthroid dose        Plan:    Return for follow up appointment as previously scheduled    Repeat TSH at return appointment    Continue PNV    [facility] appointment in 2 weeks        Halle Gentile NP     OB Visit 588358/10/2019     IUP.  Advanced maternal age.  Hypothyroidism.  Undesired fertility. Childhood asthma    TSH today.    Anatomy scan done today.    .  Continue baby aspirin, Synthroid, prenatal vitamins.    Return to clinic in 4 weeks.    Visit Summary     OB Visit 20169/12/2019     IUP.  Advanced maternal age.  Hypothyroidism.  Asthma.  Undesired fertility.    Prenatal vitamin daily.    Influenza vaccination.    Level II scan at [facility] in 4 weeks.    Increase Synthroid to 137 µg by mouth daily.    Recheck TSH in 6 weeks.    MSAFP today.    Visit Summary     OB Physical 10 52026/15/2019     IUP.  Advanced maternal age.  Asthma.  Hypothyroidism.   Undesired fertility.  History of macrosomia.    Blood work today to include TSH, OB profile, T 21.    Patient needs level II ultrasound.    Return to clinic in 4 weeks for MSAFP.    Continue Synthroid for now.    Continue prenatal vitamins.     New OB 10 20167/16/2019     New OB @ 7/4 weeks    Advance Maternal Age    Hypothyroidism    Asthma    Undesired Fertility        Visit Summary    [facility]        Prescriptions:    SIG: levothyroxine 125 mcg oral tablet, 30 days, Dispense #30 Tablet, 0 Refills    Directions: Take 1 oral tablet once a day    SIG: Ventolin HFA 90 mcg/actuation inhalation HFA aerosol inhaler,  days, Dispense #1 Inhaler, 4 Refills    Directions: II Puffs q 6hrs        Return for follow up appointment in 4 weeks for TSH, OB profile, T21 with XY and exam.    Will refer to [facility] for Level II scan.     GYN Exam (Annual/6Wk PP)10 [facility]7/5/2018     Annual    Hypothyroidism    Depression: controlled        Visit Summary    No pap today, pap next year r/t Medicare. Pt's paps for the last 6 years were all within normal limits    Pt is not fasting today, Return for follow up appointment on Monday for FBS, Lipid and TSH.    Continue OCP and Wellbutrin.     [facility] & [id number]     Depression: improved        Visit Summary    Continue Wellbutrin XL 150mg daily.    Call if needs refills.        Return for follow up appointment prn/annual.     GYN Visit & [id number] [facility]     Depression    Flu Exposure        Visit Summary    Flu swab negative        Prescriptions:    SIG: Wellbutrin  mg oral tablet extended release 24 hr, 30 days, Dispense #30 Tablet, 3 Refills    Directions: Take 1 oral tablet once a day        Return for follow up appointment in 6 weeks to check s/s of depression.     GYN Visit & [id number] [facility]6/22/2017     Depression.  Hypothyroidism.    Face-to-face time with patient 25 min., with greater than 50% of the time discussing treatment options    .  Continue Synthroid  at the same days for now.    Continue Pristiq.        Visit Summary    Ordered:    Blood Drawing    TSH     GYN Exam (Annual/6Wk PP)10 [facility]5/25/2017     Annual exam.  STD screening.  Hypothyroidism.  Depression.  Contraceptive management.    Return to clinic in one month to check TSH and signs and symptoms of depression.        Prescriptions:    SIG: Pristiq 50 mg tablet extended release 24 hr, 30 days, Dispense #30 Tablet, 11 Refills    Directions: Take 1 oral tablet once a day    Continue Synthroid.    [employer]:    Repeat TSH level-Complete ()     GYN Visit & History1/5/2016     Postpartum depression.  Hypothyroidism.  Contraceptive counseling.    Return to clinic in 3 months for TSH.    Refill on medications.     GYN Exam (Annual/6Wk PP)1012/1/2015     Postpartal visit.  Postpartum depression.  Hypothyroidism.  Preeclampsia.  Consult management.    Continue OCPs and Synthroid.    TSH LFTs.    Effexor 37.5 mg 1 by mouth daily x1 week been increased to 75 mg.    Return to clinic in one month     GYN Visit & Jbeenfk49/3/2015     Contraceptive management Hypothyroidism    Return to clinic in 4 weeks for TSH and LFTs.    Prescription for oral contraceptive pills sent to pharmacy.     OB Visit 201610/19/2015     IUP.  Elevated LFTs: PIH without proteinuria.  Advanced maternal age.  Hypermobile thyroidism.  Asthma.    Will place on labor and delivery to rule out PIH will repeat labs.    NST today within normal limits reactive with good bhvd-ms-dqhv variability positive accelerations.     OB Visit 201610/12/2015     IUP.  Hypothyroidism.  Asthma.  Advanced maternal age.    Return to clinic in one week for BPP.     OB Visit 201610/2/2015     IUP.  Hypothyroidism.  Asthma.  Advanced maternal age.  Yeast vulvitis.    Patient prescribed Diflucan.    Continue Macrobid from last visit.    BPP within normal limits today.    Continue Synthroid.    Return to clinic in one week for BPP.     OB Visit 20169/29/2015     IUP.   UTI.  Hypothyroidism.  Asthma.  Advanced maternal age.    Prescription for Macrobid & Pyridium sent for patient.    Send urine for culture and sensitivity.    Return to clinic on Friday for biophysical profile as scheduled.    Fetal movement counts and labor precautions given.     OB Visit 20169/25/2015     IUP @ 34/6 weeks    AMA    Hypothyroidism, Asthma: controlled.        Plan: GBS done today.    Return for follow up appointment one week for BPP.     OB Visit 20169/17/2015     IUP.  Advanced maternal age.  Hypothyroidism.  Asthma.    Return to clinic in one week for BPP.    Influenza vaccination.     OB Visit 15704/10/2015     IUP.  Advanced maternal age.  Hypothyroidism.  Asthma.    BPP a of a today.    Return to clinic in one week for BPP.    Fetal movement counts twice a day.  Continue Synthroid.     OB Visit 66241/3/2015     IUP.  Advanced maternal age.  Hypothyroidism.  Asthma.    Return to clinic in one week for BPP and growth scan.    TSH today.    Fetal movement counts twice a day.     OB Visit 20168/18/2015     IUP.  Advanced maternal age.  Hypothyroidism.  Asthma.    Return to clinic in 2 weeks for BPP secondary to asthma.    Continue Synthroid 150 mg.    We'll check TSH at return to clinic     OB Visit 20168/4/2015     IUP.  Hypothyroidism.  Advanced maternal age.    Ultrasound for growth done today.    Patient is now on 150 µg of Synthroid daily.    We will repeat a TSH in 2 weeks.    Return to clinic in 2 weeks.    DM and CBC today.    Anesthesia consult and 4D ultrasound     OB Visit 20167/7/2015     IUP.  Advanced maternal age.  Hypothyroidism.    Return to clinic in 4 weeks for DM/CBC.    Anesthesia consultation.    TSH/thyroid panel today.    continue Synthroid     OB Visit 20166/9/2015     IUP @ 19/3 weeks    Hypothyroidism    AMA        Plan: Return for follow up appointment in 4 weeks.     OB Visit 20165/12/2015     IUP. AMA. hypothyroidism.    MSAFP today    Return for follow up appointment  in 4 weeks u/s    tsh at [email or web address]     OB Visit 20164/14/2015     IUP @ 11/3weeks    Advanced maternal age    Hypothyroidism    Asthma        Plan: Bloodwork today to include Materniti 21, OB profile and CF profile    Start Deplin daily.    Return for follow up appointment 4 weeks.     New /20/2015     Early IUP.  Hypothyroidism.  Advanced maternal age.    Thyroid with full panel today.  continue Synthroid.    Return to clinic in 3 weeks for physical exam and labs     GYN Exam (Annual/6Wk PP)102/6/2014     Annual exam.  Contraceptive management.  Vaginal discharge.  Depression.  Hypothyroidism.  Asthma.    Primary care provider to do fasting blood work.per pt    Refill on all medications.    TSH today.     GYN Exam (Annual/6Wk PP)1012/3/2012     Annual exam.  Hypothyroidism.  Contraceptive management.  Depression.    Refill on medications given.    Return to clinic in one-year for annual.       FAMILY HISTORY:       Family History   Problem Relation Name Age of Onset    Hypertension Mother      Arthritis Maternal Aunt      Cancer Maternal Grandmother      Breast cancer Maternal Cousin         ALLERGIES AND MEDICATIONS: updated and reviewed.      274}  Review of patient's allergies indicates:   Allergen Reactions    Pcn [penicillins]      Medication List with Changes/Refills   New Medications    AZELASTINE (ASTELIN) 137 MCG (0.1 %) NASAL SPRAY    1 spray (137 mcg total) by Nasal route 2 (two) times daily.    AZITHROMYCIN (Z-MARIA E) 250 MG TABLET    Take 2 tablets by mouth on day 1; Take 1 tablet by mouth on days 2-5    BENZONATATE (TESSALON) 200 MG CAPSULE    Take 1 capsule (200 mg total) by mouth 3 (three) times daily as needed for Cough.    DEXTROMETHORPHAN-GUAIFENESIN  MG/5 ML (ROBITUSSIN-DM)  MG/5 ML LIQUID    Take 5 mLs by mouth every 6 (six) hours as needed (cough).    FLUTICASONE PROPIONATE (FLONASE) 50 MCG/ACTUATION NASAL SPRAY    1 spray (50 mcg total) by Each Nostril route once daily.     FLUTICASONE-SALMETEROL DISKUS INHALER 250-50 MCG    Inhale 1 puff into the lungs 2 (two) times daily. Controller    PREDNISONE (DELTASONE) 20 MG TABLET    Take 1 tablet (20 mg total) by mouth once daily.   Current Medications    ALBUTEROL (PROVENTIL) 2.5 MG /3 ML (0.083 %) NEBULIZER SOLUTION    Take 2.5 mg by nebulization every 6 (six) hours as needed for Wheezing. Rescue    BUPROPION (WELLBUTRIN XL) 300 MG 24 HR TABLET    Take 1 tablet (300 mg total) by mouth every morning.    LEVOTHYROXINE (SYNTHROID) 125 MCG TABLET    Take 1 tablet (125 mcg total) by mouth before breakfast.    VENTOLIN HFA 90 MCG/ACTUATION INHALER    INHALE 2 PUFFS BY MOUTH EVERY 6 HOURS   Discontinued Medications    DOCUSATE SODIUM (COLACE) 100 MG CAPSULE    Take 2 capsules (200 mg total) by mouth 2 (two) times daily as needed for Constipation.    IBUPROFEN (ADVIL,MOTRIN) 800 MG TABLET    Take 1 tablet (800 mg total) by mouth every 6 (six) hours as needed for Pain.    NAPROXEN (NAPROSYN) 500 MG TABLET    Take 1 tablet (500 mg total) by mouth every 8 (eight) hours.    PRENATAL VIT/IRON FUM/FOLIC AC (PRENATAL 1+1 ORAL)    Take by mouth.       SCREENING HISTORY:    274}  Health Maintenance         Date Due Completion Date    Hepatitis C Screening Never done ---    HIV Screening Never done ---    Hemoglobin A1c (Diabetic Prevention Screening) Never done ---    TETANUS VACCINE 09/21/2015 9/21/2005    COVID-19 Vaccine (1 - 2023-24 season) Never done ---    Influenza Vaccine (1) 09/01/2024 12/6/2023    Mammogram 01/17/2025 1/17/2024    Cervical Cancer Screening 04/21/2026 4/21/2021            REVIEW OF SYSTEMS:   Review of Systems   Constitutional:  Negative for chills, fever, malaise/fatigue and weight loss.   Respiratory:  Positive for cough, sputum production, shortness of breath and wheezing.    Cardiovascular:  Negative for chest pain, palpitations and leg swelling.   Gastrointestinal:  Negative for abdominal pain, heartburn, nausea and vomiting.  "  Genitourinary:  Negative for dysuria, frequency and urgency.   Musculoskeletal:  Negative for back pain, joint pain, myalgias and neck pain.   Neurological:  Negative for dizziness, tingling, tremors and headaches.   Psychiatric/Behavioral:  Negative for depression. The patient is not nervous/anxious.        PHYSICAL EXAM:      274}  /76 (BP Location: Right arm, Patient Position: Sitting, BP Method: Medium (Manual))   Pulse 103   Temp 98.5 °F (36.9 °C) (Oral)   Ht 5' 9" (1.753 m)   Wt 76.9 kg (169 lb 9.6 oz)   SpO2 96%   BMI 25.05 kg/m²   Wt Readings from Last 3 Encounters:   08/15/24 76.9 kg (169 lb 9.6 oz)   12/06/23 80.3 kg (177 lb)   03/30/23 79.5 kg (175 lb 4.3 oz)     BP Readings from Last 3 Encounters:   08/15/24 120/76   12/06/23 (!) 104/58   03/30/23 110/70     Estimated body mass index is 25.05 kg/m² as calculated from the following:    Height as of this encounter: 5' 9" (1.753 m).    Weight as of this encounter: 76.9 kg (169 lb 9.6 oz).     Physical Exam  Constitutional:       Appearance: Normal appearance.   HENT:      Head: Normocephalic and atraumatic.      Mouth/Throat:      Mouth: Mucous membranes are dry.   Eyes:      Extraocular Movements: Extraocular movements intact.      Pupils: Pupils are equal, round, and reactive to light.   Cardiovascular:      Rate and Rhythm: Normal rate and regular rhythm.   Pulmonary:      Effort: Pulmonary effort is normal.      Breath sounds: Normal breath sounds.      Comments: coughing  Abdominal:      General: Abdomen is flat.      Palpations: Abdomen is soft.   Musculoskeletal:         General: Normal range of motion.      Cervical back: Normal range of motion.   Skin:     General: Skin is warm.   Neurological:      General: No focal deficit present.      Mental Status: She is alert. Mental status is at baseline.   Psychiatric:         Mood and Affect: Mood normal.         Thought Content: Thought content normal.         LABS:   274}  I have reviewed " old labs below:  Lab Results   Component Value Date    WBC 10.69 10/08/2022    HGB 13.0 10/08/2022    HCT 37.6 10/08/2022    MCV 83 10/08/2022     10/08/2022     10/08/2022    K 3.7 10/08/2022     10/08/2022    CALCIUM 11.1 (H) 10/08/2022    CO2 23 10/08/2022    GLU 90 10/18/2022    BUN 14 10/08/2022    CREATININE 0.9 10/08/2022    ANIONGAP 12 10/08/2022    EGFRNONAA 90 06/21/2021    PROT 7.8 10/08/2022    ALBUMIN 4.0 10/08/2022    BILITOT 0.7 10/08/2022    ALKPHOS 90 10/08/2022    ALT 41 10/08/2022    AST 68 (H) 10/08/2022    CHOL 125 10/18/2022    TRIG 133 10/18/2022    HDL 50 10/18/2022    LDLCALC 52 10/18/2022    TSH 2.286 03/23/2023       ASSESSMENT AND PLAN:  274}  1. Bacterial infection  Will treat empirically for wheezing  - dextromethorphan-guaiFENesin  mg/5 ml (ROBITUSSIN-DM)  mg/5 mL liquid; Take 5 mLs by mouth every 6 (six) hours as needed (cough).  - fluticasone-salmeterol diskus inhaler 250-50 mcg; Inhale 1 puff into the lungs 2 (two) times daily. Controller  Dispense: 8.5 each; Refill: 0  - benzonatate (TESSALON) 200 MG capsule; Take 1 capsule (200 mg total) by mouth 3 (three) times daily as needed for Cough.  Dispense: 20 capsule; Refill: 0  - azithromycin (Z-MARIA E) 250 MG tablet; Take 2 tablets by mouth on day 1; Take 1 tablet by mouth on days 2-5  Dispense: 6 tablet; Refill: 0  - X-Ray Chest PA And Lateral; Future  - fluticasone propionate (FLONASE) 50 mcg/actuation nasal spray; 1 spray (50 mcg total) by Each Nostril route once daily.  Dispense: 18.2 mL; Refill: 0  - azelastine (ASTELIN) 137 mcg (0.1 %) nasal spray; 1 spray (137 mcg total) by Nasal route 2 (two) times daily.  Dispense: 18.2 mL; Refill: 0  - predniSONE (DELTASONE) 20 MG tablet; Take 1 tablet (20 mg total) by mouth once daily.  Dispense: 4 tablet; Refill: 0  - POCT COVID-19 Rapid Screening    2. Encounter for screening for viral disease  - HIV 1/2 Ag/Ab (4th Gen); Future  - Hepatitis C antibody;  Future    3. Acquired hypothyroidism  -advise tsh lab work from obgyn  4. Abnormal blood chemistry  - Hemoglobin A1C; Future    5. Anxiety and depression  Controlled.continue wellbutrin  - Comprehensive Metabolic Panel; Future  - CBC Auto Differential; Future         Orders Placed This Encounter   Procedures    X-Ray Chest PA And Lateral    HIV 1/2 Ag/Ab (4th Gen)    Hemoglobin A1C    Comprehensive Metabolic Panel    CBC Auto Differential    Hepatitis C antibody    POCT COVID-19 Rapid Screening       No follow-ups on file. or sooner as needed.

## 2025-01-31 ENCOUNTER — HOSPITAL ENCOUNTER (OUTPATIENT)
Dept: RADIOLOGY | Facility: HOSPITAL | Age: 45
Discharge: HOME | End: 2025-01-31
Attending: NURSE PRACTITIONER
Payer: MEDICARE

## 2025-01-31 DIAGNOSIS — Z12.31 ENCOUNTER FOR SCREENING MAMMOGRAM FOR BREAST CANCER: ICD-10-CM

## 2025-01-31 PROCEDURE — 77063 BREAST TOMOSYNTHESIS BI: CPT | Mod: 26,,, | Performed by: RADIOLOGY

## 2025-01-31 PROCEDURE — 77067 SCR MAMMO BI INCL CAD: CPT | Mod: 26,,, | Performed by: RADIOLOGY

## 2025-01-31 PROCEDURE — 77063 BREAST TOMOSYNTHESIS BI: CPT | Mod: TC

## 2025-02-14 ENCOUNTER — TELEPHONE (OUTPATIENT)
Dept: FAMILY MEDICINE | Facility: CLINIC | Age: 45
End: 2025-02-14
Payer: MEDICARE

## 2025-02-17 ENCOUNTER — OFFICE VISIT (OUTPATIENT)
Dept: FAMILY MEDICINE | Facility: CLINIC | Age: 45
End: 2025-02-17
Payer: MEDICARE

## 2025-02-17 ENCOUNTER — RESULTS FOLLOW-UP (OUTPATIENT)
Dept: FAMILY MEDICINE | Facility: CLINIC | Age: 45
End: 2025-02-17

## 2025-02-17 ENCOUNTER — LAB VISIT (OUTPATIENT)
Dept: LAB | Facility: HOSPITAL | Age: 45
End: 2025-02-17
Attending: STUDENT IN AN ORGANIZED HEALTH CARE EDUCATION/TRAINING PROGRAM
Payer: MEDICARE

## 2025-02-17 VITALS
RESPIRATION RATE: 18 BRPM | HEIGHT: 69 IN | DIASTOLIC BLOOD PRESSURE: 68 MMHG | HEART RATE: 70 BPM | WEIGHT: 170 LBS | BODY MASS INDEX: 25.18 KG/M2 | SYSTOLIC BLOOD PRESSURE: 100 MMHG

## 2025-02-17 DIAGNOSIS — E55.9 VITAMIN D DEFICIENCY DISEASE: ICD-10-CM

## 2025-02-17 DIAGNOSIS — E03.9 ACQUIRED HYPOTHYROIDISM: ICD-10-CM

## 2025-02-17 DIAGNOSIS — Z11.59 ENCOUNTER FOR SCREENING FOR VIRAL DISEASE: ICD-10-CM

## 2025-02-17 DIAGNOSIS — Z13.220 ENCOUNTER FOR LIPID SCREENING FOR CARDIOVASCULAR DISEASE: ICD-10-CM

## 2025-02-17 DIAGNOSIS — E03.9 ACQUIRED HYPOTHYROIDISM: Primary | ICD-10-CM

## 2025-02-17 DIAGNOSIS — E03.1 CONGENITAL HYPOTHYROIDISM: ICD-10-CM

## 2025-02-17 DIAGNOSIS — F41.9 ANXIETY: ICD-10-CM

## 2025-02-17 DIAGNOSIS — Z13.6 ENCOUNTER FOR LIPID SCREENING FOR CARDIOVASCULAR DISEASE: ICD-10-CM

## 2025-02-17 LAB
ALBUMIN SERPL BCP-MCNC: 4 G/DL (ref 3.5–5.2)
ALP SERPL-CCNC: 103 U/L (ref 40–150)
ALT SERPL W/O P-5'-P-CCNC: 49 U/L (ref 10–44)
ANION GAP SERPL CALC-SCNC: 8 MMOL/L (ref 8–16)
AST SERPL-CCNC: 32 U/L (ref 10–40)
BASOPHILS # BLD AUTO: 0.04 K/UL (ref 0–0.2)
BASOPHILS NFR BLD: 0.8 % (ref 0–1.9)
BILIRUB SERPL-MCNC: 0.7 MG/DL (ref 0.1–1)
BUN SERPL-MCNC: 14 MG/DL (ref 6–20)
CALCIUM SERPL-MCNC: 10.2 MG/DL (ref 8.7–10.5)
CHLORIDE SERPL-SCNC: 107 MMOL/L (ref 95–110)
CO2 SERPL-SCNC: 26 MMOL/L (ref 23–29)
CREAT SERPL-MCNC: 0.8 MG/DL (ref 0.5–1.4)
DIFFERENTIAL METHOD BLD: NORMAL
EOSINOPHIL # BLD AUTO: 0.1 K/UL (ref 0–0.5)
EOSINOPHIL NFR BLD: 2.7 % (ref 0–8)
ERYTHROCYTE [DISTWIDTH] IN BLOOD BY AUTOMATED COUNT: 12.2 % (ref 11.5–14.5)
EST. GFR  (NO RACE VARIABLE): >60 ML/MIN/1.73 M^2
GLUCOSE SERPL-MCNC: 104 MG/DL (ref 70–110)
HCT VFR BLD AUTO: 40 % (ref 37–48.5)
HGB BLD-MCNC: 13.5 G/DL (ref 12–16)
IMM GRANULOCYTES # BLD AUTO: 0.01 K/UL (ref 0–0.04)
IMM GRANULOCYTES NFR BLD AUTO: 0.2 % (ref 0–0.5)
LYMPHOCYTES # BLD AUTO: 2.1 K/UL (ref 1–4.8)
LYMPHOCYTES NFR BLD: 39.5 % (ref 18–48)
MCH RBC QN AUTO: 28.4 PG (ref 27–31)
MCHC RBC AUTO-ENTMCNC: 33.8 G/DL (ref 32–36)
MCV RBC AUTO: 84 FL (ref 82–98)
MONOCYTES # BLD AUTO: 0.7 K/UL (ref 0.3–1)
MONOCYTES NFR BLD: 12.6 % (ref 4–15)
NEUTROPHILS # BLD AUTO: 2.3 K/UL (ref 1.8–7.7)
NEUTROPHILS NFR BLD: 44.2 % (ref 38–73)
NRBC BLD-RTO: 0 /100 WBC
PLATELET # BLD AUTO: 319 K/UL (ref 150–450)
PMV BLD AUTO: 10 FL (ref 9.2–12.9)
POTASSIUM SERPL-SCNC: 4 MMOL/L (ref 3.5–5.1)
PROT SERPL-MCNC: 8.6 G/DL (ref 6–8.4)
RBC # BLD AUTO: 4.75 M/UL (ref 4–5.4)
SODIUM SERPL-SCNC: 141 MMOL/L (ref 136–145)
WBC # BLD AUTO: 5.24 K/UL (ref 3.9–12.7)

## 2025-02-17 PROCEDURE — 87389 HIV-1 AG W/HIV-1&-2 AB AG IA: CPT | Performed by: STUDENT IN AN ORGANIZED HEALTH CARE EDUCATION/TRAINING PROGRAM

## 2025-02-17 PROCEDURE — 85025 COMPLETE CBC W/AUTO DIFF WBC: CPT | Performed by: STUDENT IN AN ORGANIZED HEALTH CARE EDUCATION/TRAINING PROGRAM

## 2025-02-17 PROCEDURE — 86803 HEPATITIS C AB TEST: CPT | Performed by: STUDENT IN AN ORGANIZED HEALTH CARE EDUCATION/TRAINING PROGRAM

## 2025-02-17 PROCEDURE — 82306 VITAMIN D 25 HYDROXY: CPT | Performed by: STUDENT IN AN ORGANIZED HEALTH CARE EDUCATION/TRAINING PROGRAM

## 2025-02-17 PROCEDURE — 36415 COLL VENOUS BLD VENIPUNCTURE: CPT | Performed by: STUDENT IN AN ORGANIZED HEALTH CARE EDUCATION/TRAINING PROGRAM

## 2025-02-17 PROCEDURE — 80053 COMPREHEN METABOLIC PANEL: CPT | Performed by: STUDENT IN AN ORGANIZED HEALTH CARE EDUCATION/TRAINING PROGRAM

## 2025-02-17 RX ORDER — LEVOTHYROXINE SODIUM 125 UG/1
125 TABLET ORAL
Qty: 90 TABLET | Refills: 3 | Status: SHIPPED | OUTPATIENT
Start: 2025-02-17

## 2025-02-17 RX ORDER — TACROLIMUS 1 MG/G
OINTMENT TOPICAL
COMMUNITY
Start: 2025-01-03

## 2025-02-17 RX ORDER — BUSPIRONE HYDROCHLORIDE 5 MG/1
5 TABLET ORAL 2 TIMES DAILY PRN
Qty: 60 TABLET | Refills: 11 | Status: SHIPPED | OUTPATIENT
Start: 2025-02-17 | End: 2026-02-17

## 2025-02-17 RX ORDER — CLOBETASOL PROPIONATE 0.5 MG/G
OINTMENT TOPICAL
COMMUNITY
Start: 2024-11-23

## 2025-02-17 NOTE — PROGRESS NOTES
"SUBJECTIVE:    CHIEF COMPLAINT:   Chief Complaint   Patient presents with    Follow-up           274}    HISTORY OF PRESENT ILLNESS:  Jayde Russ is a 44 y.o. female who presents to the clinic today   History of Present Illness    CHIEF COMPLAINT:  Ms. Russ presents today for follow up    MENTAL HEALTH:  She reports mood is "so-so" with good response to Wellbutrin 300mg. She expresses anxiety related to her 's jail during immigration process in another country.    RESPIRATORY:  She continues Advair inhaler for respiratory management and uses Albuterol rescue inhaler as needed for wheezing.     PREVENTIVE CARE:  She follows up with her OB-GYN every 6 months with next appointment scheduled for August. Thyroid check in December was normal. Mammogram is scheduled for tomorrow.    DIET:  She reports reducing sugar intake due to venous issues.    ROS:  General: -fever, -chills, -fatigue, -weight gain, -weight loss  Eyes: -vision changes, -redness, -discharge  ENT: -ear pain, -nasal congestion, -sore throat  Cardiovascular: -chest pain, -palpitations, -lower extremity edema  Respiratory: -cough, -shortness of breath, +wheezing  Gastrointestinal: -abdominal pain, -nausea, -vomiting, -diarrhea, -constipation, -blood in stool  Genitourinary: -dysuria, -hematuria, -frequency  Musculoskeletal: -joint pain, -muscle pain  Skin: -rash, -lesion  Neurological: -headache, -dizziness, -numbness, -tingling  Psychiatric: +anxiety, -depression, -sleep difficulty          PAST MEDICAL HISTORY:     274}  Past Medical History:   Diagnosis Date    Abnormal Pap smear of cervix     When 18 years old; had to have surgery    AMA (advanced maternal age) multigravida 35+, second trimester     Asthma     Depression affecting pregnancy in second trimester, antepartum     Hypothyroidism        PAST SURGICAL HISTORY:  Past Surgical History:   Procedure Laterality Date    POSTPARTUM LIGATION OF FALLOPIAN TUBE Bilateral 2/24/2020    " Procedure: LIGATION, FALLOPIAN TUBE, POSTPARTUM;  Surgeon: Rangel Rosa MD;  Location: [facility] OR;  Service: OB/GYN;  Laterality: Bilateral;    TYMPANOSTOMY TUBE PLACEMENT         SOCIAL HISTORY:  Social History[1]    FAMILY HISTORY:       Family History   Problem Relation Name Age of Onset    Hypertension Mother      Arthritis Maternal Aunt      Cancer Maternal Grandmother      Breast cancer Maternal Cousin         ALLERGIES AND MEDICATIONS: updated and reviewed.      274}  Review of patient's allergies indicates:   Allergen Reactions    Pcn [penicillins]      Medication List with Changes/Refills   New Medications    BUSPIRONE (BUSPAR) 5 MG TAB    Take 1 tablet (5 mg total) by mouth 2 (two) times daily as needed (anxiety).   Current Medications    ALBUTEROL (PROVENTIL) 2.5 MG /3 ML (0.083 %) NEBULIZER SOLUTION    Take 2.5 mg by nebulization every 6 (six) hours as needed for Wheezing. Rescue    ALBUTEROL (VENTOLIN HFA) 90 MCG/ACTUATION INHALER    Inhale 2 puffs into the lungs every 6 (six) hours. Rescue    AZELASTINE (ASTELIN) 137 MCG (0.1 %) NASAL SPRAY    1 spray (137 mcg total) by Nasal route 2 (two) times daily.    BUPROPION (WELLBUTRIN XL) 300 MG 24 HR TABLET    Take 1 tablet (300 mg total) by mouth every morning.    CLOBETASOL 0.05% (TEMOVATE) 0.05 % OINT    APPLY OINTMENT TOPICALLY TWICE DAILY FOR 14 DAYS AS NEEDED FOR MODERATE TO SEVERE FLARE-UPS    FLUTICASONE PROPIONATE (FLONASE) 50 MCG/ACTUATION NASAL SPRAY    1 spray (50 mcg total) by Each Nostril route once daily.    FLUTICASONE-SALMETEROL DISKUS INHALER 250-50 MCG    Inhale 1 puff into the lungs 2 (two) times daily. Controller    PROTOPIC 0.1 % OINTMENT    1 beto applied topically 2 times a day for 30 days   Changed and/or Refilled Medications    Modified Medication Previous Medication    LEVOTHYROXINE (SYNTHROID) 125 MCG TABLET levothyroxine (SYNTHROID) 125 MCG tablet       Take 1 tablet (125 mcg total) by mouth before breakfast.    Take 1 tablet (125  "mcg total) by mouth before breakfast.       SCREENING HISTORY:    274}  Health Maintenance         Date Due Completion Date    Hepatitis C Screening Never done ---    HIV Screening Never done ---    TETANUS VACCINE 09/21/2015 9/21/2005    COVID-19 Vaccine (1 - 2024-25 season) Never done ---    Mammogram 01/31/2026 1/31/2025    Hemoglobin A1c (Diabetic Prevention Screening) 01/20/2028 1/20/2025    Cervical Cancer Screening 12/09/2029 12/9/2024    RSV Vaccine (Age 60+ and Pregnant patients) (1 - 1-dose 75+ series) 05/14/2055 ---            REVIEW OF SYSTEMS:   ROS    PHYSICAL EXAM:      274}  /68 (BP Location: Left arm, Patient Position: Sitting)   Pulse 70   Resp 18   Ht 5' 9" (1.753 m)   Wt 77.1 kg (170 lb)   BMI 25.10 kg/m²   Wt Readings from Last 3 Encounters:   02/17/25 77.1 kg (170 lb)   02/03/25 78 kg (172 lb)   12/09/24 77.8 kg (171 lb 8 oz)     BP Readings from Last 3 Encounters:   02/17/25 100/68   02/03/25 118/72   12/09/24 120/82     Estimated body mass index is 25.1 kg/m² as calculated from the following:    Height as of this encounter: 5' 9" (1.753 m).    Weight as of this encounter: 77.1 kg (170 lb).     Physical Exam  HENT:      Head: Normocephalic and atraumatic.      Mouth/Throat:      Mouth: Mucous membranes are dry.      Pharynx: Oropharynx is clear.   Eyes:      Extraocular Movements: Extraocular movements intact.      Conjunctiva/sclera: Conjunctivae normal.   Cardiovascular:      Rate and Rhythm: Normal rate and regular rhythm.   Pulmonary:      Effort: Pulmonary effort is normal.      Breath sounds: Normal breath sounds.   Abdominal:      General: Bowel sounds are normal.      Palpations: Abdomen is soft.   Musculoskeletal:         General: Normal range of motion.      Cervical back: Normal range of motion.   Skin:     General: Skin is warm.   Neurological:      General: No focal deficit present.      Mental Status: She is alert. Mental status is at baseline.   Psychiatric:         " Mood and Affect: Mood normal.         Thought Content: Thought content normal.         LABS:   274}  I have reviewed old labs below:  Lab Results   Component Value Date    WBC 10.69 10/08/2022    HGB 13.0 10/08/2022    HCT 37.6 10/08/2022    MCV 83 10/08/2022     10/08/2022     10/08/2022    K 3.7 10/08/2022     10/08/2022    CALCIUM 11.1 (H) 10/08/2022    CO2 23 10/08/2022    GLU 90 10/18/2022    BUN 14 10/08/2022    CREATININE 0.9 10/08/2022    ANIONGAP 12 10/08/2022    EGFRNONAA 90 06/21/2021    PROT 7.8 10/08/2022    ALBUMIN 4.0 10/08/2022    BILITOT 0.7 10/08/2022    ALKPHOS 90 10/08/2022    ALT 41 10/08/2022    AST 68 (H) 10/08/2022    CHOL 133 01/20/2025    TRIG 62 01/20/2025    HDL 65 01/20/2025    LDLCALC 55 01/20/2025    TSH 1.810 01/20/2025    HGBA1C 5.9 (H) 01/20/2025       ASSESSMENT AND PLAN:  274}  Assessment & Plan    IMPRESSION:  Assessed patient's mood and current Wellbutrin dosage (300mg)  Evaluated need for additional medication to manage anxiety  Reviewed asthma management, noting patient's use of Advair and Albuterol  Determined to check liver and kidney function due to lack of recent testing    DEPRESSION:  - Evaluated the patient's current mood, which is reported as 'so so' with feelings of sadness due to 's situation.  - Assessed the efficacy of the current Wellbutrin 300mg daily regimen, which the patient has been taking for an extended period.  - Discussed the possibility of increasing Wellbutrin dosage from 300mg to 450mg.  - Continued the current prescription of Wellbutrin 300mg daily.    ANXIETY:  - Suggested adding a new medication for anxiety and stress management.  - Assessed the patient's reported feelings of anxiety and acknowledged the stress due to current life situation.  - Discussed the benefits of a new anxiolytic medication, including its non-addictive properties and flexible dosing options.  - Educated the patient on the option to take the  medication either as needed or daily, based on symptoms.    PREDIABETES:  - Reviewed the blood sugar checked by Ms. Neri (OB-GYN), which indicate prediabetes.  - Acknowledged Ms. Neri's recommendations for lifestyle changes.  - Noted the patient's efforts in reducing sugar intake.  - MsPrincess Russ to continue efforts to reduce sugar intake for diabetes management.    ASTHMA:  - Evaluated the patient's asthma control, noting it is fairly well-managed with no recent flares.  - Inquired about the frequency of inhaler use.  - Continued the prescription for Advair inhaler as previously prescribed.  - Maintained the prescription for Albuterol inhaler to be used as needed for wheezing.    LABS:  - Ordered liver and kidney function tests.  - Advised the patient to complete lab work at their convenience, with the option to do it immediately after the visit.    FOLLOW UP:  - Instructed the patient to follow up with Ms. Neri in August for labs.    OTHER INSTRUCTIONS:  - Acknowledged the patient's report of  being in [place] due [place] legal issues.  - Recognized the difficulty of the patient's current situation.  - Suggested medication to help manage stress related to the 's absence.        1. Congenital hypothyroidism  - levothyroxine (SYNTHROID) 125 MCG tablet; Take 1 tablet (125 mcg total) by mouth before breakfast.  Dispense: 90 tablet; Refill: 3    2. Acquired hypothyroidism  - CBC Auto Differential; Future  - Comprehensive Metabolic Panel; Future    3. Vitamin D deficiency disease  - Vitamin D 25-Hydroxy; Future    4. Encounter for lipid screening for cardiovascular disease    5. Encounter for screening for viral disease  - HIV 1/2 Ag/Ab (4th Gen); Future  - Hepatitis C Antibody; Future    6. Anxiety  - busPIRone (BUSPAR) 5 MG Tab; Take 1 tablet (5 mg total) by mouth 2 (two) times daily as needed (anxiety).  Dispense: 60 tablet; Refill: 11         Orders Placed This Encounter   Procedures    CBC Auto  Differential    Comprehensive Metabolic Panel    Vitamin D 25-Hydroxy    HIV 1/2 Ag/Ab (4th Gen)    Hepatitis C Antibody       Follow up in about 6 months (around 8/17/2025). or sooner as needed.                 [1]   Social History  Socioeconomic History    Marital status:    Tobacco Use    Smoking status: Never    Smokeless tobacco: Never   Substance and Sexual Activity    Alcohol use: Never    Drug use: Never    Sexual activity: Yes     Birth control/protection: See Surgical Hx   Social History Narrative    Plans from [facility]     OB Visit 20162/20/2020     IUP @ 38 6/7    AMA    Congenital hypothyroidism    Undesired fertility    Asthma        Visit Summary:    BPP: 8/8; tasneem 16    SVE: 2/60/-2/vertex        Doing well        Plan:    Return for follow up appointment in 1 week with BPP/US for growth    C BID        Halle Gentile NP     OB Visit 20162/13/2020     IUP @ 37 6/7    AMA    Hypothyroidism    Undesired fertility    Asthma        Visit Summary:    BPP: 8/8; TASNEEM within normal limits    Vertex    SVE: 1-2/60/-2        Plan:    Return for follow up appointment in 1 week with NST    Anesthesia consult today    FMC BID        Halle Gentile NP     OB Visit 20162/4/2020     IUP @ 36/4 weeks    AMA    Hypothyroidism    Undesired Fertility    Asthma        Visit Summary    Growth scan within normal limits    Vertex    BPP 8/8, NST reactive = 10/10    GBS today    TSH today    Kick counts BID        Return for follow up appointment in one week with NST.     OB Visit 20161/30/2020     IUP @ 35 6/7    AMA    Hypothyroidism    Undesired fertility    Asthma        Visit Summary:    BPP: 8/8; TASNEEM 10    Doing well        SVE: LTC        Plan:    Return for follow up appointment in 1 week with NST with TASNEEM and GBBS    FMC BID    Repeat TSH 02/13        Halle Gentile NP     OB Visit 20161/23/2020     IUP @ 34 6/7    AMA    Hypothyroidism    Undesired fertility    Asthma        Visit Summary:    TSH was 2.2-  synthroid increased to 200 mcg    BPP: 8/8; TASNEEM 13    Doing well        Plan:    Return for follow up appointment in 1 week with TASNEEM and NST    FMC BID    Repeat TSH 02/13        Halle Gentile NP     OB Visit 20161/16/2020     IUP @ 33 5/7    AMA    Hypothyroidism    Undesired fertility    Asthma        Visit Summary:    TSH today    U/S: growth: 21%; TASNEEM 9, but MVP 3 cm    Doing well        Plan:    Return for follow up appointment in 1 week with TASNEEM and NST    FMC BID        Halle Gentile NP     OB Visit 201612/31/2019     IUP @ 31 weeks    AMA    Hypothyroidism    Undesired Fertility    Asthma        Visit Summary    Pt had Synthroid adjusted 2 weeks ago.    Pt has [facility] appt scheduled but may cancel r/t insurance not covering appts.    Doing well    Anesthesia consult        Return for follow up appointment in 2weeks for growth scan and appt with Dr Roberts.     OB Visit 201612/12/2019     IUP @ 28 weeks    AMA    Hypothyroidism    Undesired fertility    Asthma        Visit Summary:    TSH, CBC, 1 hour GTT, RPR, HIV 1/2        US within normal limits        Tdap        Plan:    Return for follow up appointment in 2 weeks    [facility] BID    Keep [facility] appointment        Halle Gentile NP     OB Visit 201611/14/2019     IUP.  AMA.  Hypothyroid.  Undesired fertility.  Asthma.    Patient went to [facility] yesterday.  Reviewed recommendations.    Return to clinic in 4 weeks.    Will do CBC/DM/HIV/RPR/Tdap.    Growth scan in 4 weeks.    URI has improved.     OB Visit 201611/7/2019     IUP. AMA. Hypothyroidism. URI.    STREP and FLU neg/ neg    Mucinex D    Delsym OTC    Return for follow up appointment 1 weeks     OB Visit 201610/17/2019     IUP 20 6/7    AMA    Congenital hypothyroidism    Undesired fertility    Asthma- well controlled        Visit Summary:    PW today    BTL consents signed        Patient doing well on new synthroid dose        Plan:    Return for follow up appointment as previously scheduled    Repeat  TSH at return appointment    Continue PNV    OF appointment in 2 weeks        Halle Gentile NP     OB Visit 535144/10/2019     IUP.  Advanced maternal age.  Hypothyroidism.  Undesired fertility. Childhood asthma    TSH today.    Anatomy scan done today.    .  Continue baby aspirin, Synthroid, prenatal vitamins.    Return to clinic in 4 weeks.    Visit Summary     OB Visit 20169/12/2019     IUP.  Advanced maternal age.  Hypothyroidism.  Asthma.  Undesired fertility.    Prenatal vitamin daily.    Influenza vaccination.    Level II scan at [facility] in 4 weeks.    Increase Synthroid to 137 µg by mouth daily.    Recheck TSH in 6 weeks.    MSAFP today.    Visit Summary     OB Physical 10 90117/15/2019     IUP.  Advanced maternal age.  Asthma.  Hypothyroidism.  Undesired fertility.  History of macrosomia.    Blood work today to include TSH, OB profile, T 21.    Patient needs level II ultrasound.    Return to clinic in 4 weeks for MSAFP.    Continue Synthroid for now.    Continue prenatal vitamins.     New OB 10 20167/16/2019     New OB @ 7/4 weeks    Advance Maternal Age    Hypothyroidism    Asthma    Undesired Fertility        Visit Summary    [facility]/U[facility]        Prescriptions:    SIG: levothyroxine 125 mcg oral tablet, 30 days, Dispense #30 Tablet, 0 Refills    Directions: Take 1 oral tablet once a day    SIG: Ventolin HFA 90 mcg/actuation inhalation HFA aerosol inhaler,  days, Dispense #1 Inhaler, 4 Refills    Directions: II Puffs q 6hrs        Return for follow up appointment in 4 weeks for TSH, OB profile, T21 with XY and exam.    Will refer to [facility] for Level II scan.     GYN Exam (Annual/6Wk PP)10 [place]7/5/2018     Annual    Hypothyroidism    Depression: controlled        Visit Summary    No pap today, pap next year r/t Medicare. Pt's paps for the last 6 years were all within normal limits    Pt is not fasting today, Return for follow up appointment on Monday for FBS, Lipid and TSH.    Continue OCP and Wellbutrin.     GYN  Visit & [id number] [facility]     Depression: improved        Visit Summary    Continue Wellbutrin XL 150mg daily.    Call if needs refills.        Return for follow up appointment prn/annual.     [id number]     [facility]    Flu Exposure        Visit Summary    Flu swab negative        Prescriptions:    SIG: Wellbutrin  mg oral tablet extended release 24 hr, 30 days, Dispense #30 Tablet, 3 Refills    Directions: Take 1 oral tablet once a day        Return for follow up appointment in 6 weeks to check s/s of depression.     [id number]     Depression.  Hypothyroidism.    Face-to-face time with patient 25 min., with greater than 50% of the time discussing treatment options    .  Continue Synthroid at the same days for now.    Continue Pristiq.        Visit Summary    Ordered:    Blood Drawing    TSH     GYN Exam (Annual/6Wk PP)10 [facility]     Annual exam.  STD screening.  Hypothyroidism.  Depression.  Contraceptive management.    Return to clinic in one month to check TSH and signs and symptoms of depression.        Prescriptions:    SIG: Pristiq 50 mg tablet extended release 24 hr, 30 days, Dispense #30 Tablet, 11 Refills    Directions: Take 1 oral tablet once a day    Continue Synthroid.    [employer]:    Repeat TSH level-Complete ()     GYN Visit & History1/5/2016     Postpartum depression.  Hypothyroidism.  Contraceptive counseling.    Return to clinic in 3 months for TSH.    Refill on medications.     GYN Exam (Annual/6Wk PP)1012/1/2015     Postpartal visit.  Postpartum depression.  Hypothyroidism.  Preeclampsia.  Consult management.    Continue OCPs and Synthroid.    TSH LFTs.    Effexor 37.5 mg 1 by mouth daily x1 week been increased to 75 mg.    Return to clinic in one month     GYN Visit & [place]39/3/2015     Contraceptive management Hypothyroidism    Return to clinic in 4 weeks for TSH and LFTs.    Prescription for oral contraceptive  pills sent to pharmacy.     OB Visit 201610/19/2015     IUP.  Elevated LFTs: PIH without proteinuria.  Advanced maternal age.  Hypermobile thyroidism.  Asthma.    Will place on labor and delivery to rule out PIH will repeat labs.    NST today within normal limits reactive with good [id number] variability positive accelerations.     OB Visit 201610/12/2015     IUP.  Hypothyroidism.  Asthma.  Advanced maternal age.    Return to clinic in one week for BPP.     OB Visit 201610/2/2015     IUP.  Hypothyroidism.  Asthma.  Advanced maternal age.  Yeast vulvitis.    Patient prescribed Diflucan.    Continue Macrobid from last visit.    BPP within normal limits today.    Continue Synthroid.    Return to clinic in one week for BPP.     OB Visit 20169/29/2015     IUP.  UTI.  Hypothyroidism.  Asthma.  Advanced maternal age.    Prescription for Macrobid & Pyridium sent for patient.    Send urine for culture and sensitivity.    Return to clinic on Friday for biophysical profile as scheduled.    Fetal movement counts and labor precautions given.     OB Visit 20169/25/2015     IUP @ 34/6 weeks    AMA    Hypothyroidism, Asthma: controlled.        Plan: GBS done today.    Return for follow up appointment one week for BPP.     OB Visit 20169/17/2015     IUP.  Advanced maternal age.  Hypothyroidism.  Asthma.    Return to clinic in one week for BPP.    Influenza vaccination.     OB Visit 44767/10/2015     IUP.  Advanced maternal age.  Hypothyroidism.  Asthma.    BPP a of a today.    Return to clinic in one week for BPP.    Fetal movement counts twice a day.  Continue Synthroid.     OB Visit 32394/3/2015     IUP.  Advanced maternal age.  Hypothyroidism.  Asthma.    Return to clinic in one week for BPP and growth scan.    TSH today.    Fetal movement counts twice a day.     OB Visit 20168/18/2015     IUP.  Advanced maternal age.  Hypothyroidism.  Asthma.    Return to clinic in 2 weeks for BPP secondary to asthma.    Continue Synthroid 150  mg.    We'll check TSH at return to clinic     OB Visit 20168/4/2015     IUP.  Hypothyroidism.  Advanced maternal age.    Ultrasound for growth done today.    Patient is now on 150 µg of Synthroid daily.    We will repeat a TSH in 2 weeks.    Return to clinic in 2 weeks.    DM and CBC today.    Anesthesia consult and 4D ultrasound     OB Visit 20167/7/2015     IUP.  Advanced maternal age.  Hypothyroidism.    Return to clinic in 4 weeks for DM/CBC.    Anesthesia consultation.    TSH/thyroid panel today.    continue Synthroid     OB Visit 20166/9/2015     IUP @ 19/3 weeks    Hypothyroidism    AMA        Plan: Return for follow up appointment in 4 weeks.     OB Visit 20165/12/2015     IUP. AMA. hypothyroidism.    MSAFP today    Return for follow up appointment in 4 weeks u/s    tsh at rtc     OB Visit 20164/14/2015     IUP @ 11/3weeks    Advanced maternal age    Hypothyroidism    Asthma        Plan: Bloodwork today to include Materniti 21, OB profile and CF profile    Start Deplin daily.    Return for follow up appointment 4 weeks.     New /20/2015     Early IUP.  Hypothyroidism.  Advanced maternal age.    Thyroid with full panel today.  continue Synthroid.    Return to clinic in 3 weeks for physical exam and labs     GYN Exam (Annual/6Wk PP)102/6/2014     Annual exam.  Contraceptive management.  Vaginal discharge.  Depression.  Hypothyroidism.  Asthma.    Primary care provider to do fasting blood work.per pt    Refill on all medications.    TSH today.     GYN Exam (Annual/6Wk PP)1012/3/2012     Annual exam.  Hypothyroidism.  Contraceptive management.  Depression.    Refill on medications given.    Return to clinic in one-year for annual.

## 2025-02-18 ENCOUNTER — HOSPITAL ENCOUNTER (OUTPATIENT)
Dept: RADIOLOGY | Facility: HOSPITAL | Age: 45
Discharge: HOME | End: 2025-02-18
Attending: NURSE PRACTITIONER
Payer: MEDICARE

## 2025-02-18 DIAGNOSIS — R92.8 ABNORMALITY OF LEFT BREAST ON SCREENING MAMMOGRAM: ICD-10-CM

## 2025-02-18 LAB
25(OH)D3+25(OH)D2 SERPL-MCNC: 27 NG/ML (ref 30–96)
HCV AB SERPL QL IA: NORMAL
HIV 1+2 AB+HIV1 P24 AG SERPL QL IA: NORMAL

## 2025-02-18 PROCEDURE — 76642 ULTRASOUND BREAST LIMITED: CPT | Mod: TC,LT

## 2025-02-18 PROCEDURE — 77065 DX MAMMO INCL CAD UNI: CPT | Mod: TC,LT

## 2025-08-18 ENCOUNTER — OFFICE VISIT (OUTPATIENT)
Dept: FAMILY MEDICINE | Facility: CLINIC | Age: 45
End: 2025-08-18
Payer: MEDICARE

## 2025-08-18 VITALS
SYSTOLIC BLOOD PRESSURE: 118 MMHG | HEIGHT: 69 IN | WEIGHT: 175 LBS | RESPIRATION RATE: 18 BRPM | DIASTOLIC BLOOD PRESSURE: 62 MMHG | HEART RATE: 83 BPM | BODY MASS INDEX: 25.92 KG/M2 | OXYGEN SATURATION: 97 %

## 2025-08-18 DIAGNOSIS — J45.909 ASTHMA, UNSPECIFIED ASTHMA SEVERITY, UNSPECIFIED WHETHER COMPLICATED, UNSPECIFIED WHETHER PERSISTENT: Primary | ICD-10-CM

## 2025-08-18 DIAGNOSIS — Z13.220 ENCOUNTER FOR LIPID SCREENING FOR CARDIOVASCULAR DISEASE: ICD-10-CM

## 2025-08-18 DIAGNOSIS — R79.9 ABNORMAL BLOOD CHEMISTRY: ICD-10-CM

## 2025-08-18 DIAGNOSIS — F41.9 ANXIETY: ICD-10-CM

## 2025-08-18 DIAGNOSIS — Z00.00 ROUTINE GENERAL MEDICAL EXAMINATION AT A HEALTH CARE FACILITY: ICD-10-CM

## 2025-08-18 DIAGNOSIS — E55.9 VITAMIN D DEFICIENCY DISEASE: ICD-10-CM

## 2025-08-18 DIAGNOSIS — R93.3 ABNORMAL FINDINGS ON DIAGNOSTIC IMAGING OF OTHER PARTS OF DIGESTIVE TRACT: ICD-10-CM

## 2025-08-18 DIAGNOSIS — Z13.6 ENCOUNTER FOR LIPID SCREENING FOR CARDIOVASCULAR DISEASE: ICD-10-CM

## 2025-08-18 DIAGNOSIS — Z12.11 ENCOUNTER FOR SCREENING FOR MALIGNANT NEOPLASM OF COLON: ICD-10-CM

## 2025-08-18 DIAGNOSIS — E03.9 ACQUIRED HYPOTHYROIDISM: ICD-10-CM

## 2025-08-18 DIAGNOSIS — F32.A DEPRESSION, UNSPECIFIED DEPRESSION TYPE: ICD-10-CM

## 2025-08-18 PROCEDURE — 3074F SYST BP LT 130 MM HG: CPT | Mod: CPTII,S$GLB,, | Performed by: STUDENT IN AN ORGANIZED HEALTH CARE EDUCATION/TRAINING PROGRAM

## 2025-08-18 PROCEDURE — 99396 PREV VISIT EST AGE 40-64: CPT | Mod: S$GLB,,, | Performed by: STUDENT IN AN ORGANIZED HEALTH CARE EDUCATION/TRAINING PROGRAM

## 2025-08-18 PROCEDURE — 3078F DIAST BP <80 MM HG: CPT | Mod: CPTII,S$GLB,, | Performed by: STUDENT IN AN ORGANIZED HEALTH CARE EDUCATION/TRAINING PROGRAM

## 2025-08-18 PROCEDURE — 3044F HG A1C LEVEL LT 7.0%: CPT | Mod: CPTII,S$GLB,, | Performed by: STUDENT IN AN ORGANIZED HEALTH CARE EDUCATION/TRAINING PROGRAM

## 2025-08-18 PROCEDURE — 1159F MED LIST DOCD IN RCRD: CPT | Mod: CPTII,S$GLB,, | Performed by: STUDENT IN AN ORGANIZED HEALTH CARE EDUCATION/TRAINING PROGRAM

## 2025-08-18 PROCEDURE — 99999 PR PBB SHADOW E&M-EST. PATIENT-LVL IV: CPT | Mod: PBBFAC,,, | Performed by: STUDENT IN AN ORGANIZED HEALTH CARE EDUCATION/TRAINING PROGRAM

## 2025-08-18 PROCEDURE — 3008F BODY MASS INDEX DOCD: CPT | Mod: CPTII,S$GLB,, | Performed by: STUDENT IN AN ORGANIZED HEALTH CARE EDUCATION/TRAINING PROGRAM

## 2025-08-18 RX ORDER — BUDESONIDE AND FORMOTEROL FUMARATE DIHYDRATE 160; 4.5 UG/1; UG/1
2 AEROSOL RESPIRATORY (INHALATION) EVERY 12 HOURS
Qty: 10.2 G | Refills: 0 | Status: SHIPPED | OUTPATIENT
Start: 2025-08-18 | End: 2026-08-18

## 2025-08-18 RX ORDER — ALBUTEROL SULFATE 90 UG/1
2 INHALANT RESPIRATORY (INHALATION) EVERY 6 HOURS
Qty: 18 G | Refills: 1 | Status: SHIPPED | OUTPATIENT
Start: 2025-08-18

## 2025-08-18 RX ORDER — BUSPIRONE HYDROCHLORIDE 5 MG/1
5 TABLET ORAL 2 TIMES DAILY PRN
Qty: 60 TABLET | Refills: 11 | Status: SHIPPED | OUTPATIENT
Start: 2025-08-18 | End: 2026-08-18

## 2025-08-18 RX ORDER — BUPROPION HYDROCHLORIDE 300 MG/1
300 TABLET ORAL EVERY MORNING
Qty: 90 TABLET | Refills: 3 | Status: SHIPPED | OUTPATIENT
Start: 2025-08-18 | End: 2026-08-18

## (undated) DEVICE — SEE MEDLINE ITEM 146417

## (undated) DEVICE — SUT 4.0 VICRYL

## (undated) DEVICE — SYR B-D DISP CONTROL 10CC100/C

## (undated) DEVICE — NDL SAFETY 22G X 1.5 ECLIPSE

## (undated) DEVICE — BANDAGE ADHESIVE

## (undated) DEVICE — ELECTRODE REM PLYHSV RETURN 9

## (undated) DEVICE — GLOVE SURGEONS ULTRA TOUCH 6.5

## (undated) DEVICE — SEE MEDLINE ITEM 157148

## (undated) DEVICE — PACK TIBURON LAPAROSCOPY

## (undated) DEVICE — SUT CHROMIC 2-0 SH 27IN BRN

## (undated) DEVICE — SOL 9P NACL IRR PIC IL

## (undated) DEVICE — SEE MEDLINE ITEM 157118

## (undated) DEVICE — UNDERGLOVE BIOGEL PI SZ 6.5 LF

## (undated) DEVICE — SLEEVE SCD EXPRESS CALF MEDIUM

## (undated) DEVICE — PACK CUSTOM UNIV BASIN SLI

## (undated) DEVICE — CANISTER SUCTION 3000CC

## (undated) DEVICE — ADHESIVE DERMABOND ADVANCED

## (undated) DEVICE — GLOVE SURG ULTRA TOUCH 7